# Patient Record
Sex: MALE | Race: WHITE
[De-identification: names, ages, dates, MRNs, and addresses within clinical notes are randomized per-mention and may not be internally consistent; named-entity substitution may affect disease eponyms.]

---

## 2018-12-17 ENCOUNTER — HOSPITAL ENCOUNTER (EMERGENCY)
Dept: HOSPITAL 97 - ER | Age: 63
LOS: 1 days | Discharge: HOME | End: 2018-12-18
Payer: COMMERCIAL

## 2018-12-17 DIAGNOSIS — E78.5: ICD-10-CM

## 2018-12-17 DIAGNOSIS — F17.210: ICD-10-CM

## 2018-12-17 DIAGNOSIS — M13.861: Primary | ICD-10-CM

## 2018-12-17 DIAGNOSIS — M25.461: ICD-10-CM

## 2018-12-17 LAB
BUN BLD-MCNC: 25 MG/DL (ref 7–18)
CRP SERPL-MCNC: 24.4 MG/L (ref ?–3)
GLUCOSE SERPLBLD-MCNC: 136 MG/DL (ref 74–106)
HCT VFR BLD CALC: 39.1 % (ref 39.6–49)
LYMPHOCYTES # SPEC AUTO: 1.5 K/UL (ref 0.7–4.9)
PMV BLD: 8.8 FL (ref 7.6–11.3)
POTASSIUM SERPL-SCNC: 3.7 MMOL/L (ref 3.5–5.1)
RBC # BLD: 4.33 M/UL (ref 4.33–5.43)

## 2018-12-17 PROCEDURE — 99284 EMERGENCY DEPT VISIT MOD MDM: CPT

## 2018-12-17 PROCEDURE — 80048 BASIC METABOLIC PNL TOTAL CA: CPT

## 2018-12-17 PROCEDURE — 96375 TX/PRO/DX INJ NEW DRUG ADDON: CPT

## 2018-12-17 PROCEDURE — 96374 THER/PROPH/DIAG INJ IV PUSH: CPT

## 2018-12-17 PROCEDURE — 85025 COMPLETE CBC W/AUTO DIFF WBC: CPT

## 2018-12-17 PROCEDURE — 87040 BLOOD CULTURE FOR BACTERIA: CPT

## 2018-12-17 PROCEDURE — 83605 ASSAY OF LACTIC ACID: CPT

## 2018-12-17 PROCEDURE — 89050 BODY FLUID CELL COUNT: CPT

## 2018-12-17 PROCEDURE — 36415 COLL VENOUS BLD VENIPUNCTURE: CPT

## 2018-12-17 PROCEDURE — 85652 RBC SED RATE AUTOMATED: CPT

## 2018-12-17 PROCEDURE — 86140 C-REACTIVE PROTEIN: CPT

## 2018-12-17 PROCEDURE — 87070 CULTURE OTHR SPECIMN AEROBIC: CPT

## 2018-12-17 PROCEDURE — 89060 EXAM SYNOVIAL FLUID CRYSTALS: CPT

## 2018-12-17 NOTE — XMS REPORT
Mode Robledo MD

 Created on:2018



Patient:MARINO ALMODOVAR

Sex:Male

:1955

External Reference #:[id number]





Demographics







 Address  [address] [place] [id number]

 

 Phone  [phone]

 

 Preferred Language  Unknown

 

 Marital Status  Unknown

 

 Jewish Affiliation  Unknown

 

 Race  Unknown

 

 Ethnic Group  Unknown









Author







 Organization  [employer]









Care Team Providers







 Name  Role  Phone

 

 ChinChristine  Provider Role  Unavailable









Allergies, Adverse Reactions, Alerts







 Substance  Reaction  Event Type

 

 Lyrica  Info Not Available  Drug Allergy

 

 Orencia  Info Not Available  Non Drug Allergy

 

 Humira  Info Not Available  Non Drug Allergy

 

 Enbrel  Info Not Available  Non Drug Allergy







Problems







 Problem Type  Condition  Code  Onset Dates  Condition Status

 

 Assessment  Rheumatoid arthritis of multiple  M06.09    Active



   sites without rheumatoid factor      

 

 Assessment  Other long term (current) drug  Z79.899    Active



   therapy      

 

 Problem  Osteopenia  M85.80    Active

 

 Problem  Pain of right hand  M79.641    Active

 

 Problem  Other chronic pain  G89.29    Active

 

 Problem  Cigarette nicotine dependence,  F17.210    Active



   uncomplicated      

 

 Problem  Other long term (current) drug  Z79.899    Active



   therapy      

 

 Problem  Pain of left hand  M79.642    Active

 

 Problem  Rheumatoid arthritis of multiple  M06.09    Active



   sites without rheumatoid factor      







Medications







 Medication  Code  Code  Instructions  Start  End Date  Status  Dosage



   System      Date      

 

 Inderal LA  NDC  52755815827  160 MG Orally      Active  1 capsule



       Once a day        

 

 Propranolol HCl  NDC  35413586140  10 MG Orally      Active  1 tablet



       Once a day        

 

 Multivitamins  NDC  97560789753   Orally once a      Active  1 tablet



       day        

 

 Voltaren  NDC  81577615908  1 % Transdermal      Active  as



       PRN        directed

 

 Medrol Dose Norris  NDC  74974110891  4mg Orally once  Dec 07,    Active  as



       a day  2017      directed

 

 Xeljanz XR  NDC  01005047192  11 MG Orally    ,  Active  1 tablet



       Once a day    2018    

 

 Tricor  NDC  54136807208  145 MG Orally      Active  1 tablet



       Once a day        

 

 Trazodone HCl  NDC  06128606028  150 MG Orally      Active  1 tablet



       at night        

 

 Nuvigil  NDC  46342947959  250 MG Orally      Active  1 tablet



       Once a day        in the



               morning

 

 Calcium  NDC  96934681817  otc Orally once      Active  1 tablet



       a day        with meals

 

 Hydrocodone-Acet  ND  30298983354   MG    April  Active  1 tab



 aminophen      Orally BID to    2018    



       QID as needed        







Vital Signs







 Date/Time:  2018

 

 BMI  28.38 Index

 

 Weight  215.1 lbs

 

 Height  73 in

 

 Temperature  95.9 F

 

 Cardiac Monitoring Heart Rate  68 /min

 

 Blood Pressure Diastolic  98 mm Hg

 

 Blood Pressure Systolic  140 mm Hg







Results

No Known Results



Summary Purpose

[employer]Clinical[employer] Submission

## 2018-12-17 NOTE — XMS REPORT
Mode Robledo MD

 Created on:2015



Patient:MARINO ALMODOVAR

Sex:Male

:1955

External Reference #:[id number]





Demographics







 Address  [address] [place] [id number]

 

 Phone  [phone]

 

 Preferred Language  Unknown

 

 Marital Status  Unknown

 

 Adventist Affiliation  Unknown

 

 Race  Unknown

 

 Ethnic Group  Unknown









Author







 Organization  [employer]









Care Team Providers







 Name  Role  Phone

 

 Mode Robledo  Provider Role  Unavailable









Encounters







 Encounter  Location  Date

 

 lab order  Mode Robledo MD  2014

 

 Unknown  Mode Robledo MD  2014

 

 RX Request-- Sherine Robledo MD  2014

 

 Refill   hydrocodone  11/15  Mode Robledo MD  2014

 

 Unknown  Mode Robledo MD  May 06, 2014

 

 Refill- Sherine Robledo MD  May 29, 2014

 

 Refill-Sherine Robledo MD  2014

 

 MRI [facility] Hands  Mode Robledo MD  2015

 

 Refill  Mode Robledo MD  Dec 12, 2014

 

 F/U  Mode Robledo MD  Oct 15, 2014







Problems







 Problem Type  Condition  ICD-9 Code  Onset Dates  Condition Status

 

 Problem  Long-term (current) use of other  V58.69    Active



   medications - High Risk      

 

 Problem  Long-term (current) use of  V58.65    Active



   steroids      

 

 Problem  Rheumatoid arthritis  714.0    Active

 

 Problem  Hypertension  997.91    Active







Social History







 Social History Element  Qualifiers  Date Reported

 

 Illicit Drugs  .  none  2015

 

 Tobacco Use:  .   Are you a:: current smoker  2015

 

 Caffeine:  yes.  1-5  2015

 

 Exercise:  yes.  walk  2015

 

 Alcohol:  no.  2015







Summary Purpose

[employer]Clinical[employer] Submission

## 2018-12-17 NOTE — XMS REPORT
Mode Robledo MD

 Created on:August 3, 2015



Patient:MARINO ALMODOVAR

Sex:Male

:1955

External Reference #:[id number]





Demographics







 Address  [address] [place] [id number]

 

 Phone  [phone]

 

 Preferred Language  Unknown

 

 Marital Status  Unknown

 

 Church Affiliation  Unknown

 

 Race  Unknown

 

 Ethnic Group  Unknown









Author







 [facility]









Care Team Providers







 Name  Role  Phone

 

 Mode Robledo  Provider Role  Unavailable









Encounters







 Encounter  Location  Date

 

 [facility]  Mode Robledo MD  2015

 

 rx refill  Mode Robledo MD  2015

 

 [facility]  Mode Robledo MD  2015

 

 Refill- Sherine Robledo MD  2015

 

 1 mo f/u  Mode Robledo MD  2015

 

 3m f/u  Mode Robledo MD  2015

 

 3 mo f/u  Mode Robledo MD  2015

 

 Orencjanine Robledo MD  2015

 

 Refill- Sherine Robledo MD  2015

 

 RX Request-- Sherine Robledo MD  2015

 

 lab order  Mode Robledo MD  2014

 

 Unknown  Mode Robledo MD  2014

 

 RX Request-- Sherine Robledo MD  2014

 

 Refill   hydrocodone  11/15  Mode Robledo MD  2014

 

 Unknown  Mode Robledo MD  May 06, 2014

 

 Refill- Sherine Robledo MD  May 29, 2014

 

 NellieillMiriam Robledo MD  2014

 

 1 mo f/u  Mode Robledo MD  May 12, 2015

 

 RX Request-- Sherine Robledo MD  May 06, 2015

 

 Refill  Mode Robledo MD  Dec 12, 2014

 

 F/U  Mode Robledo MD  Oct 15, 2014

 

 MRI  Mode Robledo MD  May 14, 2015

 

 RefillMarbella Robledo MD  2015

 

 refill  Mode Robledo MD  Aug 03, 2015

 

 Refill  Mode Robledo MD  Aug 03, 2015







Problems







 Problem Type  Condition  ICD-9 Code  Onset Dates  Condition Status

 

 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

 

 Problem  Rheumatoid arthritis  714.0    Active

 

 Problem  Lumbago  724.2    Active

 

 Problem  Hypertension  997.91    Active

 

 Problem  Long-term (current) use of other  V58.69    Active



   medications - High Risk      

 

 Problem  Long-term (current) use of  V58.65    Active



   steroids      







Medications







 Medication  Code System  Code  Instructions  Start Date  End Date  Status  
Dosage

 

 ORENCIA SQ  Unknown  0  125MG SUBCUTANEOUSLY    Aug 04,  Active  125mg



       ONCE A WEEK        







Social History







 Social History Element  Qualifiers  Date Reported

 

 Illicit Drugs  .  none  2015

 

 Alcohol Screening:  .   Points: 0  2015

 

 Tobacco Use:  .   Are you a:: current smoker , How often do  2015



   you smoke cigarettes?: every day, How many  



   cigarettes a day do you smoke?: 11-20, How soon  



   after you wake up do you smoke your first  



   cigarette?: 6-30 min, Are you interested in  



   quitting?: Not ready to quit  

 

 Caffeine:  yes.  1-5  2015

 

 Exercise:  yes.  walk  2015

 

 Alcohol:  no.  2015







Summary Purpose

[employer]ClinicalWorks Submission

## 2018-12-17 NOTE — XMS REPORT
Mode Robledo MD

 Created on:2015



Patient:MARINO ALMODOVAR

Sex:Male

:1955

External Reference #:[id number]





Demographics







 Address  [address] [place] [id number]

 

 Phone  [phone]

 

 Preferred Language  Unknown

 

 Marital Status  Unknown

 

 Taoism Affiliation  Unknown

 

 Race  Unknown

 

 Ethnic Group  Unknown









Author







 Organization  [facility]









Care Team Providers







 Name  Role  Phone

 

 Mode Robledo  Provider Role  Unavailable









Encounters







 Encounter  Location  Date

 

 lab order  Mode Robledo MD  2014

 

 Unknown  Mode Robledo MD  2014

 

 RX Request-- Sherine Robledo MD  2014

 

 Refill   hydrocodone  11/15  Mode Robledo MD  2014

 

 Unknown  Mode Robledo MD  May 06, 2014

 

 Refill- Sherine Robledo MD  May 29, 2014

 

 Refill-Sherine Robledo MD  2014

 

 MRI [facility] Hands  Mode Robledo MD  2015

 

 1 mo f/u  Mode Robledo MD  May 12, 2015

 

 RX Request-- Sherine Robledo MD  May 06, 2015

 

 Refill  Mode Robledo MD  Dec 12, 2014

 

 F/U  Mode Robledo MD  Oct 15, 2014

 

 rx refill  Mode Robledo MD  2015

 

 MRI [facility] Hands  Mode Robledo MD  2015

 

 Refnick- Sherine Robledo MD  2015

 

 1 mo f/u  Mode Robledo MD  2015

 

 MRI  Mode Robledo MD  May 14, 2015

 

 3m f/u  Mode Robledo MD  2015

 

 Refill- Sherine Robledo MD  2015

 

 3 mo f/u  Mode Robledo MD  2015

 

 Sherine Robledo MD  2015

 

 Refill- Sherine Robledo MD  2015

 

 RX Request-- Sherine Robledo MD  2015







Problems







 Problem Type  Condition  ICD-9 Code  Onset Dates  Condition Status

 

 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

 

 Problem  Rheumatoid arthritis  714.0    Active

 

 Problem  Lumbago  724.2    Active

 

 Problem  Hypertension  997.91    Active

 

 Problem  Long-term (current) use of other  V58.69    Active



   medications - High Risk      

 

 Problem  Long-term (current) use of  V58.65    Active



   steroids      







Medications







 Medication  Code System  Code  Instructions  Start Date  End Date  Status  
Dosage

 

 ORENCIA SQ  Unknown  0  125MG SUBCUTANEOUSLY    Aug 04,  Active  125mg



       ONCE A WEEK        







Social History







 Social History Element  Qualifiers  Date Reported

 

 Illicit Drugs  .  none  2015

 

 Alcohol Screening:  .   Points: 0  2015

 

 Tobacco Use:  .   Are you a:: current smoker , How often do  2015



   you smoke cigarettes?: every day, How many  



   cigarettes a day do you smoke?: 11-20, How soon  



   after you wake up do you smoke your first  



   cigarette?: 6-30 min, Are you interested in  



   quitting?: Not ready to quit  

 

 Caffeine:  yes.  1-5  2015

 

 Exercise:  yes.  walk  2015

 

 Alcohol:  no.  2015







Summary Purpose

[employer]ClinicalWorks Submission

## 2018-12-17 NOTE — XMS REPORT
Mode Robledo MD

 Created on:May 29, 2014



Patient:MARINO ALMODOVAR

Sex:Male

:1955

External Reference #:[id number]





Demographics







 Address  [address] [place] [address]

 

 Phone  [phone]

 

 Preferred Language  Unknown

 

 Marital Status  Unknown

 

 Jewish Affiliation  Unknown

 

 Race  Unknown

 

 Ethnic Group  Unknown









Author







 Organization  [employer]









Care Team Providers







 Name  Role  Phone

 

 Alina Chinnna  Provider Role  Unavailable









Allergies, Adverse Reactions, Alerts







 Substance  Reaction  Event Type

 

 Lyrica  Info Not Available  Drug Allergy

 

 Humira  Info Not Available  Non Drug Allergy

 

 Enbrel  Info Not Available  Non Drug Allergy







Encounters







 Encounter  Location  Date

 

 Unknown  Mode Robledo MD  May 06, 2014

 

 Refill- Orencia  Mode Robledo MD  May 29, 2014







Problems







 Problem Type  Condition  ICD-9 Code  Onset Dates  Condition Status

 

 Problem  Long-term (current) use of other  V58.69    Active



   medications - High Risk      

 

 Problem  Long-term (current) use of  V58.65    Active



   steroids      

 

 Problem  Rheumatoid arthritis  714.0    Active

 

 Assessment  Long-term (current) use of other  V58.69    Active



   medications - High Risk      

 

 Assessment  Long-term (current) use of  V58.65    Active



   steroids      

 

 Problem  Hypertension  997.91    Active

 

 Assessment  Rheumatoid arthritis  714.0    Active







Medications







 Medication  Code System  Code  Instructions  Start  End  Status  Dosage



         Date  Date    

 

 Methotrexate  MEDISPAN  46857-9  25 MG/ML SQ      Active  1 ml



 Sodium    173-00  injection Once a        



       week        

 

 Multivitamins  MEDISPAN  12153-1   Orally once a      Active  1 tablet



     046-10  day        

 

 Hydrocodone-Aceta  MEDISPAN  95568-6   MG Orally  March  Aug 04,  Active
  1 tablet as



 minophen    367-01  every 6 hrs  2014    needed

 

 Folic Acid  MEDISPAN  07168-9  1 MG Orally Once    Aug 04,  Active  1 tablet



     507-19  a day        

 

 Tricor  MEDISPAN  84210-9  145 MG Orally      Active  1 tablet



     123-90  Once a day        

 

 Trazodone HCl  MEDISPAN  00378-3  150 MG Orally at      Active  1 tablet



     473-01  night        

 

 Calcium  MEDISPAN  72621-8  otc Orally once a      Active  1 tablet



     5034  day        with meals

 

 PredniSONE  MEDISPAN  11577-7  5 MG Orally Once  March  Aug 04,  Active  1 
tablet



     390-21  a day  2014    

 

 Voltaren  MEDISPAN  19416-1  1 % Transdermal    Aug 04,  Active  as directed



     215-05  Three times a day        

 

 ORENCIA SQ  Unknown  0  125MG    Aug 04,  Active  125MG



       SUBCUTANEOUSLY        



       ONCE A WEEK        

 

 Nexium  MEDISPAN  75600-0  40 MG Orally once      Active  1 capsule



     040-31  a day        

 

 Inderal LA  MEDISPAN  17793-0  160 MG Orally      Active  1 capsule



     659-81  Once a day        







Social History







 Social History Element  Qualifiers  Date Reported

 

 Illicit Drugs  .  none  May 06, 2014

 

 Tobacco Use:  .   Are you a:: current smoker  May 06, 2014

 

 Caffeine:  yes.  1-5  May 06, 2014

 

 Exercise:  yes.  walk  May 06, 2014

 

 Alcohol:  no.  May 06, 2014







Vital Signs







 Date/Time:  May 06, 2014

 

 Weight  210 lbs

 

 Height  72 in

 

 Temperature  96.9 F

 

 Cardiac Monitoring Heart Rate  80 /min

 

 Blood Pressure Diastolic  80 mm Hg

 

 Blood Pressure Systolic  132 mm Hg







Immunizations







 Vaccine  Administration Date

 

 Depomedrol  May 06, 2014

 

 Toradol  May 06, 2014







Summary Purpose

[employer]ClinicalWorks Submission

## 2018-12-17 NOTE — XMS REPORT
Mode Robledo MD

 Created on:2015



Patient:MARINO ALMODOVAR

Sex:Male

:1955

External Reference #:[id number]





Demographics







 Address  [address] [place] [id number]

 

 Phone  [phone]

 

 Preferred Language  Unknown

 

 Marital Status  Unknown

 

 Restorationist Affiliation  Unknown

 

 Race  Unknown

 

 Ethnic Group  Unknown









Author







 [facility]









Care Team Providers







 Name  Role  Phone

 

 Christine Chin  Provider Role  Unavailable









Allergies, Adverse Reactions, Alerts







 Substance  Reaction  Event Type

 

 Lyrica  Info Not Available  Drug Allergy

 

 Humira  Info Not Available  Non Drug Allergy

 

 Enbrel  Info Not Available  Non Drug Allergy







Encounters







 Encounter  Location  Date

 

 lab order  Mode Robledo MD  2014

 

 Unknown  Mode Robledo MD  2014

 

 RX Request-- Sherine Robledo MD  2014

 

 Refill   hydrocodone  11/15  Mode Robldeo MD  2014

 

 Unknown  Mode Robledo MD  May 06, 2014

 

 Refill- Sherine Robledo MD  May 29, 2014

 

 Refill-Sherine Robledo MD  2014

 

 MRI Bi Hands  Mode Robledo MD  2015

 

 Refill  Mode Robledo MD  Dec 12, 2014

 

 F/U  Mode Robledo MD  Oct 15, 2014

 

 3m f/u  Mode Robledo MD  2015

 

 3 mo f/u  Mode Robledo MD  2015

 

 Orencia Irvin Robledo MD  2015

 

 Refill- Sherine Robledo MD  2015







Problems







 Problem Type  Condition  ICD-9 Code  Onset Dates  Condition Status

 

 Assessment  Long-term (current) use of other  V58.69    Active



   medications - High Risk      

 

 Problem  Rheumatoid arthritis  714.0    Active

 

 Problem  Long-term (current) use of other  V58.69    Active



   medications - High Risk      

 

 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

 

 Assessment  Rheumatoid arthritis  714.0    Active

 

 Assessment  Osteoarthrosis, multiple sites  715.09    Active

 

 Problem  Long-term (current) use of  V58.65    Active



   steroids      

 

 Problem  Hypertension  997.91    Active







Medications







 Medication  Code System  Code  Instructions  Start  End  Status  Dosage



         Date  Date    

 

 Folic Acid  MEDISPAN  65483-0  1 MG Orally Once      Active  1 tablet



     507-19  a day        

 

 Trazodone HCl  MEDISPAN  00378-3  150 MG Orally at      Active  1 tablet



     473-01  night        

 

 Tricor  MEDISPAN  42400-9  145 MG Orally      Active  1 tablet



     123-90  Once a day        

 

 Multivitamins  MEDISPAN  85307-1   Orally once a      Active  1 tablet



     046-10  day        

 

 Hydrocodone-Aceta  MEDISPAN  47473-8   MG Orally      Active  1 tablet as



 minophen    116-30  every 6 hrs        needed

 

 Inderal LA  MEDISPAN  54738-8  160 MG Orally      Active  1 capsule



     479-81  Once a day        

 

 Calcium  MEDISPAN  50504-9  otc Orally once a      Active  1 tablet



     5034  day        with meals

 

 Nexium  MEDISPAN  27362-3  40 MG Orally once      Active  1 capsule



     040-31  a day        

 

 Methotrexate  MEDISPAN  39231-7  25 MG/ML SQ      Active  1 ml



 Sodium    173-00  injection Once a        



       week        

 

 PredniSONE  MEDISPAN  73534-8  5 MG Orally Once      Active  1 tablet



     390-21  a day        

 

 ORENCIA SQ  Unknown  0  125MG      Active  125mg



       SUBCUTANEOUSLY        



       ONCE A WEEK        

 

 Voltaren  MEDISPAN  46084-5  1 % Transdermal      Active  as directed



     215-05  PRN        







Social History







 Social History Element  Qualifiers  Date Reported

 

 Illicit Drugs  .  none  2015

 

 Alcohol Screening:  .   Points: 0  2015

 

 Tobacco Use:  .   Are you a:: current smoker , How often do  2015



   you smoke cigarettes?: every day, How many  



   cigarettes a day do you smoke?: 11-20, How soon  



   after you wake up do you smoke your first  



   cigarette?: 6-30 min, Are you interested in  



   quitting?: Not ready to quit  

 

 Caffeine:  yes.  1-5  2015

 

 Exercise:  yes.  walk  2015

 

 Alcohol:  no.  2015







Vital Signs







 Date/Time:  2015

 

 Weight  200 lbs

 

 Height  74 in

 

 Temperature  97.4 F

 

 Cardiac Monitoring Heart Rate  72 /min

 

 Blood Pressure Diastolic  74 mm Hg

 

 Blood Pressure Systolic  126 mm Hg







Summary Purpose

eClinicalWorks Submission

## 2018-12-17 NOTE — XMS REPORT
Mode Robledo MD

 Created on:2017



Patient:MARINO ALMODOVAR

Sex:Male

:1955

External Reference #:[id number]





Demographics







 Address  [address] [place] [id number]

 

 Phone  [phone]

 

 Preferred Language  Unknown

 

 Marital Status  Unknown

 

 Restoration Affiliation  Unknown

 

 Race  Unknown

 

 Ethnic Group  Unknown









Author







 Organization  [employer]









Care Team Providers







 Name  Role  Phone

 

 Mode Robledo  Provider Role  Unavailable









Allergies

No Known Allergies



Problems







 Problem Type  Condition  Code  Onset Dates  Condition Status

 

 Problem  Pain of right hand  M79.641    Active

 

 Problem  Pain of left hand  M79.642    Active

 

 Problem  Osteopenia  M85.80    Active

 

 Problem  Other long term (current) drug  Z79.899    Active



   therapy      

 

 Problem  Rheumatoid arthritis of multiple  M06.09    Active



   sites without rheumatoid factor      

 

 Problem  Cigarette nicotine dependence,  F17.210    Active



   uncomplicated      







Medications

No Known Medications



Results

No Known Results



Summary Purpose

eClinicalWorks Submission

## 2018-12-17 NOTE — XMS REPORT
Mode Robledo MD

 Created on:2017



Patient:MARINO ALMODOVAR

Sex:Male

:1955

External Reference #:[id number]





Demographics







 Address  [address] [place] [id number]

 

 Phone  [phone]

 

 Preferred Language  Unknown

 

 Marital Status  Unknown

 

 [employer] Affiliation  Unknown

 

 Race  Unknown

 

 Ethnic Group  Unknown









Author







 Organization  [employer]









Care Team Providers







 Name  Role  Phone

 

 Christine Chin  Provider Role  Unavailable









Allergies, Adverse Reactions, Alerts







 Substance  Reaction  Event Type

 

 Lyrica  Info Not Available  Drug Allergy

 

 Orencia  Info Not Available  Non Drug Allergy

 

 Humira  Info Not Available  Non Drug Allergy

 

 Enbrel  Info Not Available  Non Drug Allergy







Problems







 Problem Type  Condition  Code  Onset Dates  Condition Status

 

 Assessment  Other long term (current) drug  Z79.899    Active



   therapy      

 

 Assessment  Osteopenia  M85.80    Active

 

 Problem  Pain of right hand  M79.641    Active

 

 Problem  Pain of left hand  M79.642    Active

 

 Problem  Osteopenia  M85.80    Active

 

 Problem  Other long term (current) drug  Z79.899    Active



   therapy      

 

 Assessment  Rheumatoid arthritis of multiple  M06.09    Active



   sites without rheumatoid factor      

 

 Problem  Rheumatoid arthritis of multiple  M06.09    Active



   sites without rheumatoid factor      

 

 Problem  Cigarette nicotine dependence,  F17.210    Active



   uncomplicated      







Medications







 Medication  Code  Code  Instructions  Start  End  Status  Dosage



   System      Date  Date    

 

 Voltaren  NDC  95159-87  1 % Transdermal      Active  as directed



     -  PRN        

 

 Nuvigil  NDC  22389-20  250 MG Orally      Active  1 tablet in



     94-30  Once a day        the morning

 

 Inderal LA  NDC  16186-88  160 MG Orally      Active  1 capsule



     79-81  Once a day        

 

 Calcium  NDC  75988-37  otc Orally once      Active  1 tablet



     034  a day        with meals

 

 Folic Acid  NDC  46554-79  1 MG Orally Once      Active  1 tablet



     07-19  a day        

 

 Propranolol HCl  NDC  10579-12  10 MG Orally      Active  1 tablet



     82-01  Once a day        

 

 Multivitamins  NDC  15350-16   Orally once a      Active  1 tablet



     46-10  day        

 

 Tricor  ND  90563-97  145 MG Orally      Active  1 tablet



     23-90  Once a day        

 

 Trazodone HCl  NDC  09025-10  150 MG Orally at      Active  1 tablet



     73-01  night        

 

 Hydrocodone-Aceta  NDC  97790-79   MG Orally      Active  1 tablet as



 minophen    16-30  every 6 hrs        needed

 

 Xeljanz XR  NDC  11860-82  11 MG Orally      Active  1 tablet



     01-30  Once a day        







Vital Signs







 Date/Time:  2017

 

 BMI  27.24 Index

 

 Weight  209.3 lbs

 

 Height  73.5 in

 

 Temperature  97.5 F

 

 Cardiac Monitoring Heart Rate  72 /min

 

 Blood Pressure Diastolic  76 mm Hg

 

 Blood Pressure Systolic  142 mm Hg







Results

No Known Results



Summary Purpose

eClinicalWorks Submission

## 2018-12-17 NOTE — XMS REPORT
Mode Robledo MD

 Created on:2014



Patient:MARINO ALMODOVAR

Sex:Male

:1955

External Reference #:[id number]





Demographics







 Address  [address] [place] [id number]

 

 Phone  [phone]

 

 Preferred Language  Unknown

 

 Marital Status  Unknown

 

 [employer] Affiliation  Unknown

 

 Race  Unknown

 

 Ethnic Group  Unknown









Author







 Organization  [employer]









Care Team Providers







 Name  Role  Phone

 

 Mode Robledo  Provider Role  Unavailable









Encounters







 Encounter  Location  Date

 

 lab order  Mode Robledo MD  2014

 

 Unknown  Mode Robledo MD  2014

 

 RX Request-- Sherine Robledo MD  2014

 

 Refill   hydrocodone  11/15  Mode Robledo MD  2014

 

 Unknown  Mode Robledo MD  May 06, 2014

 

 Refill- Sherine Robledo MD  May 29, 2014

 

 Refill-Sherine Robledo MD  2014

 

 Refill  Mode Robledo MD  Dec 12, 2014







Problems







 Problem Type  Condition  ICD-9 Code  Onset Dates  Condition Status

 

 Problem  Long-term (current) use of other  V58.69    Active



   medications - High Risk      

 

 Problem  Long-term (current) use of  V58.65    Active



   steroids      

 

 Problem  Rheumatoid arthritis  714.0    Active

 

 Problem  Hypertension  997.91    Active

 

 Assessment  Rheumatoid arthritis  714.0    Active







Medications







 Medication  Code System  Code  Instructions  Start  End Date  Status  Dosage



         Date      

 

 Hydrocodone-Ace  MEDISPAN  32277-64   MG Orally    ,  Active  1 
tablet



 taminophen    67-01  every 6 hrs        as needed







Social History







 Social History Element  Qualifiers  Date Reported

 

 Illicit Drugs  .  none  Oct 15, 2014

 

 Tobacco Use:  .   Are you a:: current smoker  Oct 15, 2014

 

 Caffeine:  yes.  1-5  Oct 15, 2014

 

 Exercise:  yes.  walk  Oct 15, 2014

 

 Alcohol:  no.  Oct 15, 2014







Summary Purpose

[facility]

## 2018-12-17 NOTE — XMS REPORT
Mode Robledo MD

 Created on:May 3, 2017



Patient:MARINO ALMODOVAR

Sex:Male

:1955

External Reference #:[id number]





Demographics







 Address  [address] [place] [id number]

 

 Phone  [phone]

 

 Preferred Language  Unknown

 

 Marital Status  Unknown

 

 [employer] Affiliation  Unknown

 

 Race  Unknown

 

 Ethnic Group  Unknown









Author







 Organization  [employer]









Care Team Providers







 Name  Role  Phone

 

 Mode Robledo  Provider Role  Unavailable









Allergies

No Known Allergies



Problems







 Problem Type  Condition  Code  Onset Dates  Condition Status

 

 Problem  Pain of right hand  M79.641    Active

 

 Problem  Pain of left hand  M79.642    Active

 

 Problem  Osteopenia  M85.80    Active

 

 Problem  Other long term (current) drug  Z79.899    Active



   therapy      

 

 Problem  Rheumatoid arthritis of multiple  M06.09    Active



   sites without rheumatoid factor      

 

 Problem  Cigarette nicotine dependence,  F17.210    Active



   uncomplicated      







Medications

No Known Medications



Results

No Known Results



Summary Purpose

[employer] Submission

## 2018-12-17 NOTE — XMS REPORT
oMde Robledo MD

 Created on:2017



Patient:MARINO ALMODOVAR

Sex:Male

:1955

External Reference #:[id number]





Demographics







 Address  [address] [place] [id number]

 

 Phone  [phone]

 

 Preferred Language  Unknown

 

 Marital Status  Unknown

 

 [employer] Affiliation  Unknown

 

 Race  Unknown

 

 Ethnic Group  Unknown









Author







 Organization  [employer]









Care Team Providers







 Name  Role  Phone

 

 Christine Chin  Provider Role  Unavailable









Allergies, Adverse Reactions, Alerts







 Substance  Reaction  Event Type

 

 Lyrica  Info Not Available  Drug Allergy

 

 Orencia  Info Not Available  Non Drug Allergy

 

 Humira  Info Not Available  Non Drug Allergy

 

 Enbrel  Info Not Available  Non Drug Allergy







Problems







 Problem Type  Condition  Code  Onset Dates  Condition Status

 

 Assessment  Cigarette nicotine dependence,  F17.210    Active



   uncomplicated      

 

 Assessment  Rheumatoid arthritis of multiple  M06.09    Active



   sites without rheumatoid factor      

 

 Assessment  Other long term (current) drug  Z79.899    Active



   therapy      

 

 Assessment  Other chronic pain  G89.29    Active

 

 Problem  Osteopenia  M85.80    Active

 

 Problem  Pain of right hand  M79.641    Active

 

 Problem  Other chronic pain  G89.29    Active

 

 Problem  Cigarette nicotine dependence,  F17.210    Active



   uncomplicated      

 

 Problem  Other long term (current) drug  Z79.899    Active



   therapy      

 

 Problem  Pain of left hand  M79.642    Active

 

 Problem  Rheumatoid arthritis of multiple  M06.09    Active



   sites without rheumatoid factor      







Medications







 Medication  Code  Code  Instructions  Start  End  Status  Dosage



   System      Date  Date    

 

 Tricor  NDC  36219875035  145 MG Orally      Active  1 tablet



       Once a day        

 

 Nuvigil  NDC  55825624363  250 MG Orally      Active  1 tablet in



       Once a day        the morning

 

 Medrol Dose Norris  NDC  85562642148  4mg Orally once  Dec 07,    Active  as 
directed



       a day        

 

 Hydrocodone-Acet  NDC  32167425408   MG      Active  1 tab



 aminophen      Orally twice a        



       day as needed        

 

 Inderal LA  ND  52108482521  160 MG Orally      Active  1 capsule



       Once a day        

 

 Folic Acid  NDC  70760129385  1 MG      Active  TAKE 1 BY



               MOUTH DAILY

 

 Multivitamins  NDC  46056411934   Orally once a      Active  1 tablet



       day        

 

 Xeljanz XR  NDC  12462914453  11 MG Orally      Active  1 tablet



       Once a day        

 

 Voltaren  NDC  75310779118  1 % Transdermal      Active  as directed



       PRN        

 

 Propranolol HCl  NDC  39502006526  10 MG Orally      Active  1 tablet



       Once a day        

 

 Calcium  NDC  99022025488  otc Orally once      Active  1 tablet



       a day        with meals

 

 Trazodone HCl  NDC  51366478012  150 MG Orally      Active  1 tablet



       at night        







Vital Signs







 Date/Time:  Dec 07, 2017

 

 BMI  28.03 Index

 

 Weight  212.5 lbs

 

 Height  73 in

 

 Temperature  96.6 F

 

 Cardiac Monitoring Heart Rate  64 /min

 

 Blood Pressure Diastolic  88 mm Hg

 

 Blood Pressure Systolic  124 mm Hg







Results

No Known Results



Summary Purpose

[employer]Clinical[employer] Submission

## 2018-12-17 NOTE — XMS REPORT
Mode Robledo MD

 Created on:August 3, 2015



Patient:MARINO ALMODOVAR

Sex:Male

:1955

External Reference #:[id number]





Demographics







 Address  [address] [place] [id number]

 

 Phone  [phone]

 

 Preferred Language  Unknown

 

 Marital Status  Unknown

 

 Hindu Affiliation  Unknown

 

 Race  Unknown

 

 Ethnic Group  Unknown









Author







 [facility]









Care Team Providers







 Name  Role  Phone

 

 Mode Robledo  Provider Role  Unavailable









Encounters







 Encounter  Location  Date

 

 [facility]  Mode Robledo MD  2015

 

 rx refill  Mode Robledo MD  2015

 

 [facility]  Mode Robledo MD  2015

 

 Refill- Sherine Robledo MD  2015

 

 1 mo f/u  Mode Robledo MD  2015

 

 3m f/u  Mode Robledo MD  2015

 

 3 mo f/u  Mode Robledo MD  2015

 

 Orencjanine Robledo MD  2015

 

 Refill- Sherine Robledo MD  2015

 

 RX Request-- Sherine Robledo MD  2015

 

 lab order  Mode Robledo MD  2014

 

 Unknown  Mode Robledo MD  2014

 

 RX Request-- Sherine Robledo MD  2014

 

 Refill   hydrocodone  11/15  Mode Robledo MD  2014

 

 Unknown  Mode Robledo MD  May 06, 2014

 

 Refill- Sherine Robledo MD  May 29, 2014

 

 NellieillMiriam Robledo MD  2014

 

 1 mo f/u  Mode Robledo MD  May 12, 2015

 

 RX Request-- Sherine Robledo MD  May 06, 2015

 

 Refill  Mode Robledo MD  Dec 12, 2014

 

 F/U  Mode Robledo MD  Oct 15, 2014

 

 MRI  Mode Robledo MD  May 14, 2015

 

 RefillMarbella Robledo MD  2015

 

 refill  Mode Robledo MD  Aug 03, 2015

 

 Refill  Mode Robledo MD  Aug 03, 2015







Problems







 Problem Type  Condition  ICD-9 Code  Onset Dates  Condition Status

 

 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

 

 Problem  Rheumatoid arthritis  714.0    Active

 

 Problem  Lumbago  724.2    Active

 

 Problem  Hypertension  997.91    Active

 

 Problem  Long-term (current) use of other  V58.69    Active



   medications - High Risk      

 

 Problem  Long-term (current) use of  V58.65    Active



   steroids      







Social History







 Social History Element  Qualifiers  Date Reported

 

 Illicit Drugs  .  none  2015

 

 Alcohol Screening:  .   Points: 0  2015

 

 Tobacco Use:  .   Are you a:: current smoker , How often do  2015



   you smoke cigarettes?: every day, How many  



   cigarettes a day do you smoke?: 11-20, How soon  



   after you wake up do you smoke your first  



   cigarette?: 6-30 min, Are you interested in  



   quitting?: Not ready to quit  

 

 Caffeine:  yes.  1-5  2015

 

 Exercise:  yes.  walk  2015

 

 Alcohol:  no.  2015







Summary Purpose

[employer]Clinical[employer] Submission

## 2018-12-17 NOTE — XMS REPORT
Mode Robledo MD

 Created on:2015



Patient:MARINO ALMODOVAR

Sex:Male

:1955

External Reference #:[id number]





Demographics







 Address  [address] [place] [id number]

 

 Phone  [phone]

 

 Preferred Language  Unknown

 

 Marital Status  Unknown

 

 Church Affiliation  Unknown

 

 Race  Unknown

 

 Ethnic Group  Unknown









Author







 Organization  [employer]









Care Team Providers







 Name  Role  Phone

 

 Mode Robledo  Provider Role  Unavailable









Encounters







 Encounter  Location  Date

 

 lab order  Mode Robledo MD  2014

 

 Unknown  Mode Robledo MD  2014

 

 RX Request-- Sherine oRbledo MD  2014

 

 Refill   hydrocodone  11/15  Mode Robledo MD  2014

 

 Unknown  Mode Robledo MD  May 06, 2014

 

 Orencia Nellieill  Mode Robledo MD  2015

 

 Refill- Sherine Robledo MD  May 29, 2014

 

 Refill- Sherine Robledo MD  2015

 

 Refill-Sherine Robledo MD  2014

 

 [facility]  Mode Robledo MD  2015

 

 Refill  Mode Robledo MD  Dec 12, 2014

 

 F/U  Mode Robledo MD  Oct 15, 2014







Problems







 Problem Type  Condition  ICD-9 Code  Onset Dates  Condition Status

 

 Problem  Long-term (current) use of other  V58.69    Active



   medications - High Risk      

 

 Problem  Long-term (current) use of  V58.65    Active



   steroids      

 

 Problem  Rheumatoid arthritis  714.0    Active

 

 Problem  Hypertension  997.91    Active

 

 Assessment  Rheumatoid arthritis  714.0    Active







Medications







 Medication  Code System  Code  Instructions  Start Date  End Date  Status  
Dosage

 

 ORENCIA SQ  Unknown  0  125MG SUBCUTANEOUSLY      Active  125mg



       ONCE A WEEK        







Social History







 Social History Element  Qualifiers  Date Reported

 

 Illicit Drugs  .  none  2015

 

 Tobacco Use:  .   Are you a:: current smoker  2015

 

 Caffeine:  yes.  1-5  2015

 

 Exercise:  yes.  walk  2015

 

 Alcohol:  no.  2015







Summary Purpose

[employer]Clinical[employer] Submission

## 2018-12-17 NOTE — XMS REPORT
Mode Robledo MD

 Created on:May 29, 2014



Patient:MARINO ALMODOVAR

Sex:Male

:1955

External Reference #:[id number]





Demographics







 Address  [address] [place] [id number]

 

 Phone  [phone]

 

 Preferred Language  Unknown

 

 Marital Status  Unknown

 

 [employer] Affiliation  Unknown

 

 Race  Unknown

 

 Ethnic Group  Unknown









Author







 Organization  [employer]









Care Team Providers







 Name  Role  Phone

 

 ChinAlina grandenna  Provider Role  Unavailable









Encounters







 Encounter  Location  Date

 

 Unknown  Mode Robledo MD  May 06, 2014

 

 Refill- Anthonyncia  Mode Robledo MD  May 29, 2014







Problems







 Problem Type  Condition  ICD-9 Code  Onset Dates  Condition Status

 

 Problem  Long-term (current) use of other  V58.69    Active



   medications - High Risk      

 

 Problem  Long-term (current) use of  V58.65    Active



   steroids      

 

 Problem  Rheumatoid arthritis  714.0    Active

 

 Problem  Hypertension  997.91    Active

 

 Assessment  Rheumatoid arthritis  714.0    Active







Medications







 Medication  Code System  Code  Instructions  Start Date  End Date  Status  
Dosage

 

 ORENCIA SQ  Unknown  0  125MG SUBCUTANEOUSLY      Active  125MG



       ONCE A WEEK        







Social History







 Social History Element  Qualifiers  Date Reported

 

 Illicit Drugs  .  none  May 06, 2014

 

 Tobacco Use:  .   Are you a:: current smoker  May 06, 2014

 

 Caffeine:  yes.  1-5  May 06, 2014

 

 Exercise:  yes.  walk  May 06, 2014

 

 Alcohol:  no.  May 06, 2014







Summary Purpose

[employer]ClinicalWorks Submission

## 2018-12-17 NOTE — XMS REPORT
Mode Robledo MD

 Created on:2016



Patient:MARINO ALMODOVAR

Sex:Male

:1955

External Reference #:[id number]





Demographics







 Address  [address] [place] [id number]

 

 Phone  [phone]

 

 Preferred Language  Unknown

 

 Marital Status  Unknown

 

 [employer] Affiliation  Unknown

 

 Race  Unknown

 

 Ethnic Group  Unknown









Author







 [facility]  [facility]









Care Team Providers







 Name  Role  Phone

 

 Christine Chin  Provider Role  Unavailable









Allergies, Adverse Reactions, Alerts







 Substance  Reaction  Event Type

 

 Lyrica  Info Not Available  Drug Allergy

 

 Humira  Info Not Available  Non Drug Allergy

 

 Enbrel  Info Not Available  Non Drug Allergy







Encounters







 Encounter  Location  Date

 

 [facility]  Mode Robledo MD  2015

 

 3 MTH FU  Mode Robledo MD  2016

 

 actemra  Mode Robledo MD  2016

 

 deanne Robledo MD  2016

 

 rx refill  Mode Robledo MD  2015

 

 [facility]  Mode Robledo MD  2015

 

 MRI/DEXA  Mode Robledo MD  2016

 

 Refill- Deanne Robledo MD  2015

 

 3 MTH FU  Mode Robledo MD  2016

 

 1 mo f/u  Mode Robledo MD  2015

 

 3m f/u  Mode Robledo MD  2015

 

 3 mo f/u  Mode Robledo MD  2015

 

 Deanne Robledo MD  2015

 

 Refill- Deanne Robledo MD  2015

 

 RX Request-- Deanne Robledo MD  2015

 

 lab order  Mode Robledo MD  2014

 

 Unknown  Mode Robledo MD  2014

 

 RX Request-- Deanne Robledo MD  2014

 

 Refill   hydrocodone  11/15  Mode Robledo MD  2014

 

 Unknown  Mode Robledo MD  May 06, 2014

 

 Refill- Deanne Robledo MD  May 29, 2014

 

 Refill-Deanne Robledo MD  2014

 

 1 mo f/u  Mode Robledo MD  May 12, 2015

 

 RX Request-- Deanne Robledo MD  May 06, 2015

 

 Refill  Mode Robledo MD  Dec 12, 2014

 

 F/NEGAR Robledo MD  Oct 15, 2014

 

 Unknown  Mode Robledo MD  Aug 06, 2015

 

 1 mo f/u  Mode Robledo MD  2015

 

 1 mo f/u  Mode Robledo MD  2015

 

 Unknown  Mode Robledo MD  2015

 

 MRI  Mode Robledo MD  May 14, 2015

 

 Refill- Deanne Robledo MD  2015

 

 refill  Mode Robledo MD  Aug 03, 2015

 

 Refill  Mode Robledo MD  Aug 03, 2015







Problems







 Problem Type  Condition  ICD-9 Code  Onset Dates  Condition Status

 

 Assessment  Other long term (current) drug  Z79.899    Active



   therapy      

 

 Problem  Rheumatoid arthritis of multiple  714.0    Active



   sites without organ or system      



   involvement with positive      



   rheumatoid factor      

 

 Assessment  Rheumatoid arthritis of multiple  M06.09    Active



   sites without rheumatoid factor      

 

 Problem  Other long term (current) drug  Z79.899    Active



   therapy      

 

 Problem  Lumbago  724.2    Active

 

 Problem  Cigarette nicotine dependence,  F17.210    Active



   uncomplicated      

 

 Problem  Long-term (current) use of  V58.65    Active



   steroids      

 

 Problem  Hypertension  997.91    Active

 

 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

 

 Problem  Long-term (current) use of other  V58.69    Active



   medications - High Risk      







Medications







 Medication  Code System  Code  Instructions  Start  End  Status  Dosage



         Date  Date    

 

 Hydrocodone-Acet  MEDISP  [address]-43   MG    May  Active  1 tablet 
as



 aminophen      Orally every 6    27,    needed



       hrs        

 

 Methotrexate  [facility]  [address]-10  25 MG/ML SQ      Active  1 ml



 Sodium      injection Once        



       a week        

 

 Tricor  MED[facility]  [address]-09  145 MG Orally      Active  1 tablet



       Once a day        

 

 Nexium  MEDISPAN  [address]-05  40 MG Orally      Active  1 capsule



       prn        

 

 Inderal LA  [facility]  [address]-63  160 MG Orally      Active  1 capsule



       Once a day        

 

 Folic Acid  [facility]  [address]-82  1 MG Orally    Oct  Active  1 tablet



       Once a day    24,    once a day



               orally 90



               days

 

 Multivitamins  [facility]  [address]-65   Orally once a      Active  1 tablet



       day        

 

 Nuvigil  [facility]  [address]-89  250 MG Orally      Active  1 tablet in



       Once a day        the morning

 

 Voltaren  [facility]  [address]-48  1 %      Active  as directed



       Transdermal        



       PRN        

 

 Actemra SQ  [facility]  4031576244  162mg/0.9 mL  ,    Active  1 injection



       subcutaneous        



       every other        



       week        

 

 Calcium  [facility]  [address]  otc Orally      Active  1 tablet



       once a day        with meals

 

 Trazodone HCl  [facility]  [address]-34  150 MG Orally      Active  1 tablet



       at night        







Social History







 Social History Element  Qualifiers  Date Reported

 

 Illicit Drugs  .  none  2016

 

 Alcohol Screening:  .   Points: 0  2016

 

 Tobacco Use:  .   Are you a:: current smoker , How often do  2016



   you smoke cigarettes?: every day, How many  



   cigarettes a day do you smoke?: 11-20, How soon  



   after you wake up do you smoke your first  



   cigarette?: 6-30 min, Are you interested in  



   quitting?: Not ready to quit  

 

 Caffeine:  yes.  1-5  2016

 

 Exercise:  yes.  walk  2016

 

 Alcohol:  no.  2016







Vital Signs







 Date/Time:  2016

 

 Weight  206 lbs

 

 Height  73 in

 

 Temperature  97.0 F

 

 Cardiac Monitoring Heart Rate  70 /min

 

 Blood Pressure Diastolic  96 mm Hg

 

 Blood Pressure Systolic  150 mm Hg







Summary Purpose

eClinicalWorks Submission

## 2018-12-17 NOTE — XMS REPORT
Mode Robledo MD

 Created on:2017



Patient:MARINO ALMODOVAR

Sex:Male

:1955

External Reference #:[id number]





Demographics







 Address  [address] [place] [id number]

 

 Phone  [phone]

 

 Preferred Language  Unknown

 

 Marital Status  Unknown

 

 Jewish Affiliation  Unknown

 

 Race  Unknown

 

 Ethnic Group  Unknown









Author







 Organization  [employer]









Care Team Providers







 Name  Role  Phone

 

 Catherine Haley  Provider Role  Unavailable









Allergies, Adverse Reactions, Alerts







 Substance  Reaction  Event Type

 

 Lyrica  Info Not Available  Drug Allergy

 

 Orencia  Info Not Available  Non Drug Allergy

 

 Humira  Info Not Available  Non Drug Allergy

 

 Enbrel  Info Not Available  Non Drug Allergy







Problems







 Problem Type  Condition  Code  Onset Dates  Condition Status

 

 Assessment  Cigarette nicotine dependence,  F17.210    Active



   uncomplicated      

 

 Assessment  Rheumatoid arthritis of multiple  M06.09    Active



   sites without rheumatoid factor      

 

 Assessment  Other long term (current) drug  Z79.899    Active



   therapy      

 

 Assessment  Other chronic pain  G89.29    Active

 

 Problem  Osteopenia  M85.80    Active

 

 Problem  Pain of right hand  M79.641    Active

 

 Problem  Other chronic pain  G89.29    Active

 

 Problem  Cigarette nicotine dependence,  F17.210    Active



   uncomplicated      

 

 Problem  Other long term (current) drug  Z79.899    Active



   therapy      

 

 Problem  Pain of left hand  M79.642    Active

 

 Problem  Rheumatoid arthritis of multiple  M06.09    Active



   sites without rheumatoid factor      







Medications







 Medication  Code  Code  Instructions  Start  End  Status  Dosage



   System      Date  Date    

 

 Tricor  NDC  69685378143  145 MG Orally      Active  1 tablet



       Once a day        

 

 Xeljanz XR  NDC  24584465940  11 MG Orally      Active  1 tablet



       Once a day        

 

 Multivitamins  NDC  94004114818   Orally once a      Active  1 tablet



       day        

 

 Calcium  NDC  09902826785  otc Orally once      Active  1 tablet



       a day        with meals

 

 Hydrocodone-Acet  NDC  95494879303   MG    Dec 09,  Active  1 tab



 aminophen      Orally twice a        



       day as needed        

 

 Nuvigil  NDC  39102536757  250 MG Orally      Active  1 tablet in



       Once a day        the morning

 

 Inderal LA  NDC  58189607424  160 MG Orally      Active  1 capsule



       Once a day        

 

 Folic Acid  NDC  03352419445  1 MG      Active  TAKE 1 BY



               MOUTH DAILY

 

 Trazodone HCl  NDC  10170510160  150 MG Orally      Active  1 tablet



       at night        

 

 Propranolol HCl  NDC  35263515129  10 MG Orally      Active  1 tablet



       Once a day        

 

 Voltaren  NDC  70291043119  1 % Transdermal      Active  as directed



       PRN        







Vital Signs







 Date/Time:  2017

 

 BMI  28.21 Index

 

 Weight  208 lbs

 

 Height  72 in

 

 Temperature  96.3 F

 

 Cardiac Monitoring Heart Rate  68 /min

 

 Blood Pressure Diastolic  84 mm Hg

 

 Blood Pressure Systolic  142 mm Hg







Results







 Name  Result  Date  Reference Range  Unit  Abnormality Flag

 

 COMPREHENSIVE          



 METABOLIC PANEL          



 W/EGFR          

 

 ----CALCIUM  9.5  63020947  8.6-10.3  mg/dL  N

 

 ----CARBON DIOXIDE  26  02913731  20-31  mmol/L  N

 

 ----ALT  14  73764612  9-46  U/L  N

 

 ----CREATININE  1.00  11883074  0.70-1.25  mg/dL  N

 

 ----AST  23  06047194  10-35  U/L  N

 

 ----eGFR NON-AFR.  81  93540129  > OR=60  mL/min/1.  N



 AMERICAN        73m2  

 

 ----ALKALINE  43  08776736    U/L  N



 PHOSPHATASE          

 

 ----eGFR   94  13041211  > OR=60  mL/min/1.  N



 AMERICAN        73m2  

 

 ----BILIRUBIN, TOTAL  0.3  78952561  0.2-1.2  mg/dL  N

 

 ----BUN/CREATININE  NOT APPLICABLE  97655670  6-22  (calc)  



 RATIO          

 

 ----ALBUMIN/GLOBULIN  1.4  00114542  1.0-2.5  (calc)  N



 RATIO          

 

 ----SODIUM  141  94629673  135-146  mmol/L  N

 

 ----GLOBULIN  3.1  56494223  1.9-3.7  g/dL  N



         (calc)  

 

 ----POTASSIUM  4.8  50946838  3.5-5.3  mmol/L  N

 

 ----GLUCOSE  105  90325653  65-99  mg/dL  H

 

 ----CHLORIDE  106  75303628    mmol/L  N

 

 ----ALBUMIN  4.3  91412973  3.6-5.1  g/dL  N

 

 ----UREA NITROGEN  21  00240951  7-25  mg/dL  N



 (BUN)          

 

 ----PROTEIN, TOTAL  7.4  48603210  6.1-8.1  g/dL  N

 

 SED RATE BY MODIFIED          



 WESTERGREN          

 

 ----SED RATE BY  2  74774414  < OR=20  mm/h  N



 MODIFIED WESTERGREN          

 

 C-REACTIVE PROTEIN          

 

 ----C-REACTIVE  5.2  88060577  <8.0  mg/L  N



 PROTEIN          

 

 CBC (INCLUDES          



 DIFF/PLT)          

 

 ----MCHC  33.2  26062347  32.0-36.0  g/dL  N

 

 ----MCH  29.4  66448458  27.0-33.0  pg  N

 

 ----PLATELET COUNT  229  76416204  140-400  Thousand/  N



         uL  

 

 ----RDW  13.6  00682879  11.0-15.0  %  N

 

 ----BASOPHILS  0.5  89310675    %  N

 

 ----ABSOLUTE  4102  23318724  0999-0204  cells/uL  N



 NEUTROPHILS          

 

 ----ABSOLUTE  1554  08070849  850-3900  cells/uL  N



 LYMPHOCYTES          

 

 ----MPV  10.9  12954512  7.5-12.5  fL  N

 

 ----ABSOLUTE  33  70540395  0-200  cells/uL  N



 BASOPHILS          

 

 ----HEMATOCRIT  42.2  82773412  38.5-50.0  %  N

 

 ----NEUTROPHILS  63.1  91202911    %  N

 

 ----MCV  88.5  15210842  80.0-100.0  fL  N

 

 ----RED BLOOD CELL  4.77  27658844  4.20-5.80  Million/u  N



 COUNT        L  

 

 ----ABSOLUTE  689  55998150  200-950  cells/uL  N



 MONOCYTES          

 

 ----ABSOLUTE  124  73354032    cells/uL  N



 EOSINOPHILS          

 

 ----HEMOGLOBIN  14.0  71626813  13.2-17.1  g/dL  N

 

 ----EOSINOPHILS  1.9  40159975    %  N

 

 ----WHITE BLOOD CELL  6.5  90956074  3.8-10.8  Thousand/  N



 COUNT        uL  

 

 ----LYMPHOCYTES  23.9  97188002    %  N

 

 ----MONOCYTES  10.6  84680464    %  N

 

 PAIN MGMT, TRAMADOL,          



 QN,W/medMATCH,U          

 

 ----medMATCH  CONSISTENT  2017      



 Desmethyltram          

 

 ----Tramadol  NEGATIVE  10005436  <100  ng/mL  

 

 ----Prescribed Drug 1  Hydrocodone  2017      

 

 ----Desmethyltramadol  NEGATIVE  39546068  <100  ng/mL  

 

 ----medMATCH Tramadol  CONSISTENT  64414027      

 

 PAIN MGMT,TRICYCLIC          



 ANTI          



 DEPRESS,QN,W/medMATCH          



 ,U          

 

 ----medMATCH  CONSISTENT  2017      



 Nortriptyline          

 

 ----Nortriptyline  NEGATIVE  56593974  <100  ng/mL  

 

 ----medMATCH  CONSISTENT  2017      



 Amitriptyline          

 

 ----Amitriptyline  NEGATIVE  72901471  <100  ng/mL  

 

 ----Prescribed Drug 1  Hydrocodone  2017      

 

 PAIN MANAGEMENT          



 PROFILE 1 W/CONF,          



 W/DL, URINE          

 

 ----medMATCH Opiates  INCONSISTENT  2017      

 

 ----Oxycodone  NEGATIVE  99227612  <100  ng/mL  

 

 ----medMATCH Cocaine  CONSISTENT  07657602      



 Metab          

 

 ----Methadone  NEGATIVE  23216254  <100  ng/mL  



 Metabolite          

 

 ----medMATCH  CONSISTENT  2017      



 Methadone Metab          

 

 ----Opiates  NEGATIVE  75748905  <100  ng/mL  

 

 ----Prescribed Drug 1  Hydrocodone  2017      

 

 ----Creatinine  68.0  14571349  > or=20.0  mg/dL  

 

 ----pH  6.19  04079043  4.5 - 9.0    

 

 ----Oxidant  NEGATIVE  36238315  <200  mcg/mL  

 

 ----medMATCH  CONSISTENT  2017      



 Phencyclidine          

 

 ----Amphetamines  NEGATIVE  89695496  <500  ng/mL  

 

 ----Cocaine  NEGATIVE  55932510  <150  ng/mL  



 Metabolite          

 

 ----Phencyclidine  NEGATIVE  95345378  <25  ng/mL  

 

 ----medMATCH  CONSISTENT  2017      



 Amphetamines          

 

 ----medMATCH  CONSISTENT  2017      



 Marijuana Metab          

 

 ----Barbiturates  NEGATIVE  83515371  <300  ng/mL  

 

 ----Marijuana  NEGATIVE  35209136  <20  ng/mL  



 Metabolite          

 

 ----medMATCH  CONSISTENT  2017      



 Barbiturates          

 

 ----medMATCH  CONSISTENT  2017      



 Benzodiazepines          

 

 ----Benzodiazepines  NEGATIVE  52157519  <100  ng/mL  

 

 ----medMATCH  CONSISTENT  2017      



 Oxycodone          

 

 PAIN MGMT,          



 GABAPENTIN,          



 QN,W/medMATCH,U          

 

 ----Prescribed Drug 1  Hydrocodone  2017      

 

 ----Gabapentin  NEGATIVE  55327203  <1000  ng/mL  

 

 ----medMATCH  CONSISTENT  2017      



 Gabapentin          

 

 ZOLPIDEM,          



 QUANTITATIVE, URINE          

 

 ----ZOLPIDEM  NEGATIVE  99709545  <5  ng/mL  

 

 ----ZOLPIDEM  NEGATIVE  07442156  <5  ng/mL  



 METABOLITE          

 

 PAIN MGMT, FENTANYL,          



 QN,W/medMATCH,U          

 

 ----medMATCH  CONSISTENT  2017      



 Norfentanyl          

 

 ----Norfentanyl  NEGATIVE  38735912  <0.5  ng/mL  

 

 ----medMATCH Fentanyl  CONSISTENT  2017      

 

 ----Fentanyl  NEGATIVE  57000052  <0.5  ng/mL  

 

 ----Prescribed Drug 1  Hydrocodone  2017      

 

 PAIN MGMT,          



 PREGABALIN,          



 QN,W/medMATCH,U          

 

 ----medMATCH  CONSISTENT  2017      



 Pregabalin          

 

 ----Pregabalin  NEGATIVE  68528134  <1000  ng/mL  

 

 ----Prescribed Drug 1  Hydrocodone  2017      

 

 PAIN MGMT,          



 CARISOPRODOL          



 METAB,QN,W/medMATCH,U          

 

 ----medMATCH  CONSISTENT  2017      



 Meprobamate          

 

 ----Meprobamate  NEGATIVE  87962257  <1000  ng/mL  

 

 ----Prescribed Drug 1  Hydrocodone  2017      







Summary Purpose

[employer]ClinicalWorks Submission

## 2018-12-17 NOTE — XMS REPORT
Mode Robledo MD

 Created on:2016



Patient:MARINO ALMODOVAR

Sex:Male

:1955

External Reference #:[id number]





Demographics







 Address  [address] [place] [id number]

 

 Phone  [phone]

 

 Preferred Language  Unknown

 

 Marital Status  Unknown

 

 Episcopalian Affiliation  Unknown

 

 Race  Unknown

 

 Ethnic Group  Unknown









Author







 Organization  [facility]









Care Team Providers







 Name  Role  Phone

 

 Chin Christine  Provider Role  Unavailable









Encounters







 Encounter  Location  Date

 

 MRI Bi Hands  Mode Robledo MD  2015

 

 3 MTH FU  Mode Robledo MD  2016

 

 actemdarian Robledo MD  2016

 

 orencia live Robledo MD  2016

 

 Actemra rx  Mode Robledo MD  Aug 02, 2016

 

 rx refill  Mode Robledo MD  2015

 

 MRI Bi Hands  Mode Robledo MD  2015

 

 MRI/DEXA  Mode Robledo MD  2016

 

 Refill- Sherine Robledo MD  2015

 

 3 MTH CHRISTINA Robledo MD  2016

 

 1 mo f/u  Mode Robledo MD  2015

 

 3m f/u  Mode Robledo MD  2015

 

 3 mo f/u  Mode Robledo MD  2015

 

 Sherine Robledo MD  2015

 

 Refill- Sherine Robledo MD  2015

 

 RX Request-- Sherine Robledo MD  2015

 

 lab order  Mode Robledo MD  2014

 

 Unknown  Mode Robledo MD  2014

 

 RX Request-- Sherine Robledo MD  2014

 

 Refill   hydrocodone  11/15  Mode Robledo MD  2014

 

 Unknown  Mode Robledo MD  May 06, 2014

 

 Refill- Sherine Robledo MD  May 29, 2014

 

 Refill-Sherine Robledo MD  2014

 

 1 mo f/u  Mode Robledo MD  May 12, 2015

 

 RX Request-- Sherine Robledo MD  May 06, 2015

 

 Refill  Mode Robledo MD  Dec 12, 2014

 

 F/U  Mode Robledo MD  Oct 15, 2014

 

 Unknown  Mode Robledo MD  Aug 06, 2015

 

 1 mo f/u  Mode Robledo MD  2015

 

 1 mo f/u  Mode Robledo MD  2015

 

 Unknown  Mode Robledo MD  2015

 

 MRI  Mode Robledo MD  May 14, 2015

 

 Refill- Orebuster Robledo MD  2015

 

 refill  Mode Robledo MD  Aug 03, 2015

 

 Refill  Mode Robledo MD  Aug 03, 2015







Problems







 Problem Type  Condition  ICD-9 Code  Onset Dates  Condition Status

 

 Problem  Hypertension  997.91    Active

 

 Problem  Rheumatoid arthritis of multiple  714.0    Active



   sites without organ or system      



   involvement with positive      



   rheumatoid factor      

 

 Problem  Cigarette nicotine dependence,  F17.210    Active



   uncomplicated      

 

 Problem  Other long term (current) drug  Z79.899    Active



   therapy      

 

 Problem  Rheumatoid arthritis of multiple  M06.09    Active



   sites without rheumatoid factor      

 

 Problem  Long-term (current) use of other  V58.69    Active



   medications - High Risk      

 

 Problem  Long-term (current) use of  V58.65    Active



   steroids      

 

 Problem  Lumbago  724.2    Active

 

 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







Medications







 Medication  Code System  Code  Instructions  Start  End  Status  Dosage



         Date  Date    

 

 Actemra SQ  MEDISPAN  6071775251  162mg/0.9 mL  Aug 02,    Active  1 injection



       subcutaneous  2016      



       once a week        







Social History







 Social History Element  Qualifiers  Date Reported

 

 Illicit Drugs  .  none  2016

 

 Alcohol Screening:  .   Points: 0  2016

 

 Tobacco Use:  .   Are you a:: current smoker , How often do  2016



   you smoke cigarettes?: every day, How many  



   cigarettes a day do you smoke?: 11-20, How soon  



   after you wake up do you smoke your first  



   cigarette?: 6-30 min, Are you interested in  



   quitting?: Not ready to quit  

 

 Caffeine:  yes.  1-5  2016

 

 Exercise:  yes.  walk  2016

 

 Alcohol:  no.  2016







Summary Purpose

[employer]ClinicalWorks Submission

## 2018-12-17 NOTE — XMS REPORT
Mode Robledo MD

 Created on:2016



Patient:MARINO ALMODOVAR

Sex:Male

:1955

External Reference #:[id number]





Demographics







 Address  [address] [place] [id number]

 

 Phone  [phone]

 

 Preferred Language  Unknown

 

 Marital Status  Unknown

 

 [employer]ar[employer] Affiliation  Unknown

 

 Race  Unknown

 

 Ethnic Group  Unknown









Author







 [facility]









Care Team Providers







 Name  Role  Phone

 

 Christine Chin  Provider Role  Unavailable









Allergies, Adverse Reactions, Alerts







 Substance  Reaction  Event Type

 

 Lyrica  Info Not Available  Drug Allergy

 

 Orencia  Info Not Available  Non Drug Allergy

 

 Humira  Info Not Available  Non Drug Allergy

 

 Enbrel  Info Not Available  Non Drug Allergy







Encounters







 Encounter  Location  Date

 

 MRI Bi Hands  Mode Robledo MD  2015

 

 3 MTH CHRISTINA Robledo MD  2016

 

 actinga Robledo MD  2016

 

 deanne Robledo MD  2016

 

 Actemra rx  Mode Robledo MD  Aug 02, 2016

 

 rx refill  Mode Robledo MD  2015

 

 DEXA  Mode Robledo MD  Oct 03, 2016

 

 MRI Bi Hands  Mode Robledo MD  2015

 

 MRI/DEXA  Mode Robledo MD  2016

 

 Refill- Deanne Robledo MD  2015

 

 3 MTH CHRISTINA Robledo MD  2016

 

 1 mo f/u  Mode Robledo MD  2015

 

 3m f/u  Mode Robledo MD  2015

 

 3 mo f/u  Mode Robledo MD  2015

 

 3 MTH CHRISTINA Robledo MD  Oct 11, 2016

 

 Deanne Robledo MD  2015

 

 Refill- Deanne Robledo MD  2015

 

 RX Request-- Deanne Robledo MD  2015

 

 lab order  Mode Robledo MD  2014

 

 Unknown  Mode Robledo MD  2014

 

 RX Request-- Deanne Robledo MD  2014

 

 Refill   hydrocodone  11/15  Mode Robledo MD  2014

 

 Unknown  Mode Robledo MD  May 06, 2014

 

 Refill- Deanne Robledo MD  May 29, 2014

 

 Refill-Deanne Robledo MD  2014

 

 1 mo f/u  Mode Robledo MD  May 12, 2015

 

 RX Request-- Deanne Robledo MD  May 06, 2015

 

 Refill  Mode Robledo MD  Dec 12, 2014

 

 F/U  Mode Robledo MD  Oct 15, 2014

 

 Unknown  Mode Robledo MD  Aug 06, 2015

 

 1 mo f/u  Mode Robledo MD  2015

 

 1 mo f/u  Mode Robledo MD  2015

 

 Unknown  Mode Robledo MD  2015

 

 MRI  Mode Robledo MD  May 14, 2015

 

 Refill- Deanne Robledo MD  2015

 

 refill  Mode Robledo MD  Aug 03, 2015

 

 Refill  Mode Robledo MD  Aug 03, 2015







Problems







 Problem Type  Condition  ICD-9 Code  Onset Dates  Condition Status

 

 Problem  Pain of left hand  M79.642    Active

 

 Problem  Rheumatoid arthritis of multiple  M06.09    Active



   sites without rheumatoid factor      

 

 Problem  Pain of right hand  M79.641    Active

 

 Assessment  Rheumatoid arthritis of multiple  M06.09    Active



   sites without rheumatoid factor      

 

 Assessment  Other long term (current) drug  Z79.899    Active



   therapy      

 

 Problem  Cigarette nicotine dependence,  F17.210    Active



   uncomplicated      

 

 Problem  Other long term (current) drug  Z79.899    Active



   therapy      







Medications







 Medication  Code  Code  Instructions  Start  End  Status  Dosage



   System      Date  Date    

 

 Actemra SQ  [facility]  1474721899  162mg/0.9 mL  Aug 02,  Oct  Inactive  1 
injection



       subcutaneous  2016,    



       once a week        

 

 Nuvigil  [facility]  [address]-3  250 MG Orally      Active  1 tablet in



     0  Once a day        the morning

 

 Calcium  [facility]  [address]  otc Orally      Active  1 tablet



       once a day        with meals

 

 Voltaren  [facility]  [address]-6  1 %      Active  as directed



     1  Transdermal        



       PRN        

 

 Propranolol HCl  [facility]  [address]-4  10 MG Orally      Active  1 tablet



     1  Once a day        

 

 Nexium  [facility]  [address]-3  40 MG Orally      Active  1 capsule



     1  prn        

 

 Tricor  [facility]  [address]-8  145 MG Orally      Active  1 tablet



     0  Once a day        

 

 Xeljanz XR  [facility]  [address]-3  11 MG Orally  Oct 11,    Active  1 tablet



     0  Once a day        

 

 Methotrexate  [facility]  [address]-4  25 MG/ML      Active  1 ml



 Sodium    0  Injection Once        



       a week        

 

 Trazodone HCl  [facility]  [address]-5  150 MG Orally      Active  1 tablet



     1  at night        

 

 Multivitamins  [facility]  [address]-0   Orally once a      Active  1 tablet



     0  day        

 

 Folic Acid  [facility]  [address]-4  1 MG Orally      Active  1 tablet



     9  Once a day        once a day



               orally 90



               days

 

 Hydrocodone-Acet  [facility]  [address]-3   MG    Nov  Active  1 tablet as



 aminophen    0  Orally every 6    10,    needed



       hrs        

 

 Inderal LA  [facility]  [address]-8  160 MG Orally      Active  1 capsule



     1  Once a day        







Social History







 Social History Element  Qualifiers  Date Reported

 

 Illicit Drugs  .  none  Oct 11, 2016

 

 Alcohol Screening:  .   Points: 0  Oct 11, 2016

 

 Tobacco Use:  .   Are you a:: current smoker , How often do  Oct 11, 2016



   you smoke cigarettes?: every day, How many  



   cigarettes a day do you smoke?: 11-20, How soon  



   after you wake up do you smoke your first  



   cigarette?: 6-30 min, Are you interested in  



   quitting?: Not ready to quit  

 

 Caffeine:  yes.  1-5  Oct 11, 2016

 

 Exercise:  yes.  walk  Oct 11, 2016

 

 Alcohol:  no.  Oct 11, 2016







Vital Signs







 Date/Time:  Oct 11, 2016

 

 Weight  201 lbs

 

 Height  73 in

 

 Temperature  97.0 F

 

 Cardiac Monitoring Heart Rate  68 /min

 

 Blood Pressure Diastolic  80 mm Hg

 

 Blood Pressure Systolic  128 mm Hg







Results







 

 

 CBC (INCLUDES DIFF/PLT)









 ABSOLUTE BASOPHILS(-0-200 cells/uL)  35

 

 ABSOLUTE EOSINOPHILS(- cells/uL)  168

 

 LYMPHOCYTES(- %)  27.9

 

 NEUTROPHILS(- %)  63.6

 

 PLATELET COUNT(-140-400 Thousand/uL)  156

 

 EOSINOPHILS(- %)  2.9

 

 RDW(-11.0-15.0 %)  14.7

 

 MONOCYTES(- %)  5.0

 

 MCHC(-32.0-36.0 g/dL)  33.5

 

 MCH(-27.0-33.0 pg)  32.0

 

 MCV(-80.0-100.0 fL)  95.8

 

 ABSOLUTE NEUTROPHILS(-8336-4724 cells/uL)  3689

 

 MPV(-7.5-11.5 fL)  9.1

 

 ABSOLUTE MONOCYTES(-200-950 cells/uL)  290

 

 ABSOLUTE LYMPHOCYTES(-850-3900 cells/uL)  1618

 

 BASOPHILS(- %)  0.6

 

 WHITE BLOOD CELL COUNT(-3.8-10.8 Thousand/uL)  5.8

 

 RED BLOOD CELL COUNT(-4.20-5.80 Million/uL)  4.30

 

 HEMOGLOBIN(-13.2-17.1 g/dL)  13.8

 

 HEMATOCRIT(-38.5-50.0 %)  41.2







Summary Purpose

eClinicalWorks Submission

## 2018-12-17 NOTE — XMS REPORT
Mode Robledo MD

 Created on:2014



Patient:MARINO ALMODOVAR

Sex:Male

:1955

External Reference #:[id number]





Demographics







 Address  [address] [place] [address]

 

 Phone  [phone]

 

 Preferred Language  Unknown

 

 Marital Status  Unknown

 

 [employer] Affiliation  Unknown

 

 Race  Unknown

 

 Ethnic Group  Unknown









Author







 Organization  [employer]









Care Team Providers







 Name  Role  Phone

 

 Alina Chinnna  Provider Role  Unavailable









Allergies, Adverse Reactions, Alerts







 Substance  Reaction  Event Type

 

 Lyrica  Info Not Available  Drug Allergy

 

 Humira  Info Not Available  Non Drug Allergy

 

 Enbrel  Info Not Available  Non Drug Allergy







Encounters







 Encounter  Location  Date

 

 lab order  Mode Robledo MD  2014

 

 Unknown  Mode Robledo MD  2014

 

 Unknown  Mode Robledo MD  May 06, 2014

 

 Refill- Sherine Robledo MD  May 29, 2014

 

 Refill-Sherine Robledo MD  2014







Problems







 Problem Type  Condition  ICD-9 Code  Onset Dates  Condition Status

 

 Problem  Long-term (current) use of other  V58.69    Active



   medications - High Risk      

 

 Problem  Long-term (current) use of  V58.65    Active



   steroids      

 

 Problem  Rheumatoid arthritis  714.0    Active

 

 Assessment  Long-term (current) use of other  V58.69    Active



   medications - High Risk      

 

 Assessment  Long-term (current) use of  V58.65    Active



   steroids      

 

 Problem  Hypertension  997.91    Active

 

 Assessment  Rheumatoid arthritis  714.0    Active







Medications







 Medication  Code System  Code  Instructions  Start  End  Status  Dosage



         Date  Date    

 

 ORENCIA SQ  Unknown  0  125MG      Active  125MG



       SUBCUTANEOUSLY        



       ONCE A WEEK        

 

 Nexium  [facility]  84353-5  40 MG Orally once      Active  1 capsule



     040-31  a day        

 

 PredniSONE  [facility]  07135-3  5 MG Orally Once  March    Active  1 tablet



     390-21  a day  2014      

 

 Multivitamins  [facility]  60770-1   Orally once a      Active  1 tablet



     046-10  day        

 

 Voltaren  [facility]  50449-3  1 % Transdermal    Aug 04,  Active  as directed



     215-05  Three times a day        

 

 Calcium  [facility]  26930-5  otc Orally once a      Active  1 tablet



     5034  day        with meals

 

 Trazodone HCl  [facility]  00378-3  150 MG Orally at      Active  1 tablet



     473-01  night        

 

 Methotrexate  [facility]  26341-2  25 MG/ML SQ      Active  1 ml



 Sodium    173-00  injection Once a        



       week        

 

 Tricor  [facility]  22188-7  145 MG Orally      Active  1 tablet



     123-90  Once a day        

 

 Hydrocodone-Aceta  [facility]  33239-2   MG Orally  March    Active  1 
tablet as



 minophen    367-01  every 6 hrs  2014      needed

 

 Inderal LA  [facility]  02750-7  160 MG Orally      Active  1 capsule



     479-81  Once a day        

 

 Aricept  [facility]  55096-1  23 MG Orally Once      Active  1 tablet at



     247-30  a day        bedtime

 

 Folic Acid  [facility]  11331-9  1 MG Orally Once      Active  1 tablet



     507-19  a day        







Social History







 Social History Element  Qualifiers  Date Reported

 

 Illicit Drugs  .  none  2014

 

 Tobacco Use:  .   Are you a:: current smoker  2014

 

 Caffeine:  yes.  1-5  2014

 

 Exercise:  yes.  walk  2014

 

 Alcohol:  no.  2014







Vital Signs







 Date/Time:  2014

 

 Weight  204 lbs

 

 Height  72 in

 

 Temperature  97.0 F

 

 Cardiac Monitoring Heart Rate  80 /min

 

 Blood Pressure Diastolic  88 mm Hg

 

 Blood Pressure Systolic  140 mm Hg







Summary Purpose

eClinicalWorks Submission

## 2018-12-17 NOTE — XMS REPORT
Mode Robledo MD

 Created on:2015



Patient:MARINO ALMODOVAR

Sex:Male

:1955

External Reference #:[id number]





Demographics







 Address  [address] [place] [id number]

 

 Phone  [phone]

 

 Preferred Language  Unknown

 

 Marital Status  Unknown

 

 Islam Affiliation  Unknown

 

 Race  Unknown

 

 Ethnic Group  Unknown









Author







 Organization  [facility]









Care Team Providers







 Name  Role  Phone

 

 ChinAlina grandenna  Provider Role  Unavailable









Allergies, Adverse Reactions, Alerts







 Substance  Reaction  Event Type

 

 Lyrica  Info Not Available  Drug Allergy

 

 Humira  Info Not Available  Non Drug Allergy

 

 Enbrel  Info Not Available  Non Drug Allergy







Encounters







 Encounter  Location  Date

 

 [facility]  Mode Robledo MD  2015

 

 rx refill  Mode Robledo MD  2015

 

 [facility]  Mode Robledo MD  2015

 

 Refill- Sherine Robledo MD  2015

 

 1 mo f/u  Mode Rolbedo MD  2015

 

 3m f/u  Mode Robledo MD  2015

 

 3 mo f/u  Mode Robledo MD  2015

 

 Anthonyncjanine Robledo MD  2015

 

 Refill- Sherine Robledo MD  2015

 

 RX Request-- Sherine Robledo MD  2015

 

 lab order  Mode Robledo MD  2014

 

 Unknown  Mode Robledo MD  2014

 

 RX Request-- Sherine Robledo MD  2014

 

 Refill   hydrocodone  11/15  Mode Robledo MD  2014

 

 Unknown  Mode Robledo MD  May 06, 2014

 

 Refill- Sherine Robledo MD  May 29, 2014

 

 Refill-Sherine Robledo MD  2014

 

 1 mo f/u  Mode Robledo MD  May 12, 2015

 

 RX Request-- Sherine Robledo MD  May 06, 2015

 

 Refill  Mode Robledo MD  Dec 12, 2014

 

 F/U  Mode Robledo MD  Oct 15, 2014

 

 Unknown  Mode Robledo MD  Aug 06, 2015

 

 1 mo f/u  Mode Robledo MD  2015

 

 1 mo f/u  Mode Robledo MD  2015

 

 Unknown  Mode Robledo MD  2015

 

 MRI  Mode Robledo MD  May 14, 2015

 

 Refill- Orencia  Mode Robledo MD  2015

 

 refill  Mode Robledo MD  Aug 03, 2015

 

 Refill  Mode Robledo MD  Aug 03, 2015







Problems







 Problem Type  Condition  ICD-9 Code  Onset Dates  Condition Status

 

 Assessment  Rheumatoid arthritis of multiple  M06.09    Active



   sites without rheumatoid factor      

 

 Assessment  Other long term (current) drug  Z79.899    Active



   therapy      

 

 Problem  Lumbago  724.2    Active

 

 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

 

 Problem  Other long term (current) drug  Z79.899    Active



   therapy      

 

 Problem  Hypertension  997.91    Active

 

 Problem  Rheumatoid arthritis of multiple  714.0    Active



   sites without organ or system      



   involvement with positive      



   rheumatoid factor      

 

 Problem  Long-term (current) use of other  V58.69    Active



   medications - High Risk      

 

 Problem  Long-term (current) use of  V58.65    Active



   steroids      







Medications







 Medication  Code System  Code  Instructions  Start  End  Status  Dosage



         Date  Date    

 

 Folic Acid  [facility]  47628836706  1 Orally Once    May  Active  take 1



       a day    03,    tablet by



           2016    mouth once



               daily

 

 Methotrexate  [facility]  [address]-74  25 MG/ML SQ      Active  1 ml



 Sodium      injection Once        



       a week        

 

 Trazodone HCl  [facility]  [address]-88  150 MG Orally      Active  1 tablet



       at night        

 

 Calcium  [facility]  30864-46171  otc Orally      Active  1 tablet



       once a day        with meals

 

 Hydrocodone-Acet  [facility]  [address]-80   MG    Dec  Active  1 tablet



 aminophen      Orally every 6    05,    as needed



       hrs        

 

 Nuvigil  [facility]  [address]-90  250 MG Orally      Active  1 tablet



       Once a day        in the



               morning

 

 Multivitamins  [facility]  [address]-18   Orally once a      Active  1 tablet



       day        

 

 Nexium  [facility]  [address]-99  40 MG Orally      Active  1 capsule



       prn        

 

 Inderal LA  [facility]  [address]-30  160 MG Orally      Active  1 capsule



       Once a day        

 

 Tricor  [facility]  [address]-56  145 MG Orally      Active  1 tablet



       Once a day        

 

 Voltaren  [facility]  [address]-19  1 %    May  Active  as



       Transdermal    03,    directed



       PRN    2016    







Social History







 Social History Element  Qualifiers  Date Reported

 

 Illicit Drugs  .  none  2015

 

 Alcohol Screening:  .   Points: 0  2015

 

 Tobacco Use:  .   Are you a:: current smoker , How often do  2015



   you smoke cigarettes?: every day, How many  



   cigarettes a day do you smoke?: 11-20, How soon  



   after you wake up do you smoke your first  



   cigarette?: 6-30 min, Are you interested in  



   quitting?: Not ready to quit  

 

 Caffeine:  yes.  1-5  2015

 

 Exercise:  yes.  walk  2015

 

 Alcohol:  no.  2015







Vital Signs







 Date/Time:  2015

 

 Weight  202 lbs

 

 Height  72 in

 

 Temperature  96.7 F

 

 Cardiac Monitoring Heart Rate  64 /min

 

 Blood Pressure Diastolic  90 mm Hg

 

 Blood Pressure Systolic  130 mm Hg







Results







 

 

 CBC (INCLUDES DIFF/PLT)









 ABSOLUTE NEUTROPHILS(-4259-7158 cells/uL)  5457

 

 MPV(-7.5-11.5 fL)  9.7

 

 EOSINOPHILS(- %)  1.2

 

 HEMOGLOBIN(-13.2-17.1 g/dL)  13.8

 

 MONOCYTES(- %)  7.1

 

 HEMATOCRIT(-38.5-50.0 %)  42.0

 

 LYMPHOCYTES(- %)  19.4

 

 MCV(-80.0-100.0 fL)  96.2

 

 NEUTROPHILS(- %)  71.8

 

 MCH(-27.0-33.0 pg)  31.7

 

 ABSOLUTE BASOPHILS(-0-200 cells/uL)  38

 

 MCHC(-32.0-36.0 g/dL)  32.9

 

 BASOPHILS(- %)  0.5

 

 ABSOLUTE EOSINOPHILS(- cells/uL)  91

 

 RDW(-11.0-15.0 %)  14.7

 

 WHITE BLOOD CELL COUNT(-3.8-10.8 Thousand/uL)  7.6

 

 PLATELET COUNT(-140-400 Thousand/uL)  198

 

 ABSOLUTE MONOCYTES(-200-950 cells/uL)  540

 

 RED BLOOD CELL COUNT(-4.20-5.80 Million/uL)  4.36

 

 ABSOLUTE LYMPHOCYTES(-850-3900 cells/uL)  1474









 COMPREHENSIVE METABOLIC PANEL W/EGFR









 GLOBULIN(-1.9-3.7 g/dL (calc))  2.7

 

 eGFR AFRICAN AMERICAN(-> OR=60 mL/min/1.73m2)  84

 

 eGFR NON-AFR. AMERICAN(-> OR=60 mL/min/1.73m2)  72

 

 ALBUMIN(-3.6-5.1 g/dL)  4.1

 

 SODIUM(-135-146 mmol/L)  138

 

 PROTEIN, TOTAL(-6.1-8.1 g/dL)  6.8

 

 BUN/CREATININE RATIO(-6-22 (calc))  NOT APPLICABLE

 

 CALCIUM(-8.6-10.3 mg/dL)  9.6

 

 AST(-10-35 U/L)  17

 

 GLUCOSE(-65-99 mg/dL)  114

 

 ALKALINE PHOSPHATASE(- U/L)  36

 

 BILIRUBIN, TOTAL(-0.2-1.2 mg/dL)  0.4

 

 CREATININE(-0.70-1.33 mg/dL)  1.11

 

 ALBUMIN/GLOBULIN RATIO(-1.0-2.5 (calc))  1.5

 

 UREA NITROGEN (BUN)(-7-25 mg/dL)  20

 

 CARBON DIOXIDE(-19-30 mmol/L)  27

 

 ALT(-9-46 U/L)  13

 

 POTASSIUM(-3.5-5.3 mmol/L)  4.7

 

 CHLORIDE(- mmol/L)  106









 C-REACTIVE PROTEIN









 C-REACTIVE PROTEIN(-<0.80 mg/dL)  0.15









 RHEUMATOID FACTOR









 RHEUMATOID FACTOR(-<14 IU/mL)  34









 SED RATE BY MODIFIED WESTERGREN









 SED RATE BY MODIFIED WESTERGREN(-< OR=20 mm/h)  2







Summary Purpose

[employer]Clinical[facility] Submission

## 2018-12-17 NOTE — XMS REPORT
Mode Robledo MD

 Created on:2014



Patient:MARINO ALMODOVAR

Sex:Male

:1955

External Reference #:[id number]





Demographics







 Address  [address] [place] [id number]

 

 Phone  [phone]

 

 Preferred Language  Unknown

 

 Marital Status  Unknown

 

 Congregation Affiliation  Unknown

 

 Race  Unknown

 

 Ethnic Group  Unknown









Author







 Organization  [facility]









Care Team Providers







 Name  Role  Phone

 

 Mode Robledo  Provider Role  Unavailable









Encounters







 Encounter  Location  Date

 

 lab order  Mode Robledo MD  2014

 

 Unknown  Mode Robledo MD  2014

 

 RX Request-- Sherine Robledo MD  2014

 

 Refill   hydrocodone  11/15  Mode Robledo MD  2014

 

 Unknown  Mode Robledo MD  May 06, 2014

 

 Refill- Sherine Robledo MD  May 29, 2014

 

 Refill-Sherine Robledo MD  2014







Problems







 Problem Type  Condition  ICD-9 Code  Onset Dates  Condition Status

 

 Problem  Long-term (current) use of other  V58.69    Active



   medications - High Risk      

 

 Problem  Long-term (current) use of  V58.65    Active



   steroids      

 

 Problem  Rheumatoid arthritis  714.0    Active

 

 Problem  Hypertension  997.91    Active

 

 Assessment  Rheumatoid arthritis  714.0    Active







Medications







 Medication  Code System  Code  Instructions  Start  End Date  Status  Dosage



         Date      

 

 Hydrocodone-Ace  [facility]  92056-39   MG Orally    Dec 12,  Active  1 
tablet



 taminophen    16-30  every 6 hrs        as needed







Social History







 Social History Element  Qualifiers  Date Reported

 

 Illicit Drugs  .  none  Oct 15, 2014

 

 Tobacco Use:  .   Are you a:: current smoker  Oct 15, 2014

 

 Caffeine:  yes.  1-5  Oct 15, 2014

 

 Exercise:  yes.  walk  Oct 15, 2014

 

 Alcohol:  no.  Oct 15, 2014







Summary Purpose

[employer]Clinical[employer] Submission

## 2018-12-17 NOTE — XMS REPORT
Mode Robledo MD

 Created on:2016



Patient:MARINO ALMODOVAR

Sex:Male

:1955

External Reference #:[id number]





Demographics







 Address  [address] [place] [id number]

 

 Phone  [phone]

 

 Preferred Language  Unknown

 

 Marital Status  Unknown

 

 Ep[employer] Affiliation  Unknown

 

 Race  Unknown

 

 Ethnic Group  Unknown









Author







 Organization  [facility]









Care Team Providers







 Name  Role  Phone

 

 Mode Robledo  Provider Role  Unavailable









Encounters







 Encounter  Location  Date

 

 [facility]  Mode Robledo MD  2015

 

 3 MTH FU  Mode Robledo MD  2016

 

 actemra  Mode Robledo MD  2016

 

 orencia live Robledo MD  2016

 

 rx refill  Mode Robledo MD  2015

 

 [facility]  Mode Robledo MD  2015

 

 MRI/DEXA  Mode Robledo MD  2016

 

 Refill- Sherine Robledo MD  2015

 

 1 mo f/u  Mode Robledo MD  2015

 

 3m f/u  Mode Robledo MD  2015

 

 3 mo f/u  Mode Robledo MD  2015

 

 Orencjanine Robledo MD  2015

 

 Refill- Sherine Robledo MD  2015

 

 RX Request-- Sherine Robledo MD  2015

 

 lab order  Mode Robledo MD  2014

 

 Unknown  Mode Robledo MD  2014

 

 RX Request-- Sherine Robledo MD  2014

 

 Refill   hydrocodone  11/15  Mode Robledo MD  2014

 

 Unknown  Mode Robledo MD  May 06, 2014

 

 Refill- Sherine Robledo MD  May 29, 2014

 

 Refill-Sherine Robledo MD  2014

 

 1 mo f/u  Mode Robledo MD  May 12, 2015

 

 RX Request-- Sherine Robledo MD  May 06, 2015

 

 Refill  Mode Robledo MD  Dec 12, 2014

 

 F/U  Mode Robledo MD  Oct 15, 2014

 

 Unknown  Mode Robledo MD  Aug 06, 2015

 

 1 mo f/u  Mode Robledo MD  2015

 

 1 mo f/u  Mode Robledo MD  2015

 

 Unknown  Mode Robledo MD  2015

 

 MRI  Mode Robledo MD  May 14, 2015

 

 Refill- Orencia  Mode Robledo MD  2015

 

 refill  Mode Robledo MD  Aug 03, 2015

 

 Refill  Mode Robledo MD  Aug 03, 2015







Problems







 Problem Type  Condition  ICD-9 Code  Onset Dates  Condition Status

 

 Problem  Rheumatoid arthritis of multiple  714.0    Active



   sites without organ or system      



   involvement with positive      



   rheumatoid factor      

 

 Problem  Other long term (current) drug  Z79.899    Active



   therapy      

 

 Problem  Lumbago  724.2    Active

 

 Problem  Cigarette nicotine dependence,  F17.210    Active



   uncomplicated      

 

 Problem  Long-term (current) use of  V58.65    Active



   steroids      

 

 Problem  Hypertension  997.91    Active

 

 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

 

 Problem  Long-term (current) use of other  V58.69    Active



   medications - High Risk      







Social History







 Social History Element  Qualifiers  Date Reported

 

 Illicit Drugs  .  none  2016

 

 Alcohol Screening:  .   Points: 0  2016

 

 Tobacco Use:  .   Are you a:: current smoker , How often do  2016



   you smoke cigarettes?: every day, How many  



   cigarettes a day do you smoke?: 11-20, How soon  



   after you wake up do you smoke your first  



   cigarette?: 6-30 min, Are you interested in  



   quitting?: Not ready to quit  

 

 Caffeine:  yes.  1-5  2016

 

 Exercise:  yes.  walk  2016

 

 Alcohol:  no.  2016







Summary Purpose

[employer]ClinicalWorks Submission

## 2018-12-17 NOTE — XMS REPORT
Mode Robledo MD

 Created on:2018



Patient:MARINO ALMODOVAR

Sex:Male

:1955

External Reference #:[id number]





Demographics







 Address  [address] [place] [id number]

 

 Phone  [phone]

 

 Preferred Language  Unknown

 

 Marital Status  Unknown

 

 Christian Affiliation  Unknown

 

 Race  Unknown

 

 Ethnic Group  Unknown









Author







 Organization  [employer]









Care Team Providers







 Name  Role  Phone

 

 ChinChristine  Provider Role  Unavailable









Allergies, Adverse Reactions, Alerts







 Substance  Reaction  Event Type

 

 Lyrica  Info Not Available  Drug Allergy

 

 Orencia  Info Not Available  Non Drug Allergy

 

 Humira  Info Not Available  Non Drug Allergy

 

 Enbrel  Info Not Available  Non Drug Allergy







Problems







 Problem Type  Condition  Code  Onset Dates  Condition Status

 

 Assessment  Other chronic pain  G89.29    Active

 

 Assessment  Rheumatoid arthritis of multiple  M06.09    Active



   sites without rheumatoid factor      

 

 Assessment  Other long term (current) drug  Z79.899    Active



   therapy      

 

 Problem  Osteopenia  M85.80    Active

 

 Problem  Pain of right hand  M79.641    Active

 

 Problem  Other chronic pain  G89.29    Active

 

 Problem  Cigarette nicotine dependence,  F17.210    Active



   uncomplicated      

 

 Problem  Other long term (current) drug  Z79.899    Active



   therapy      

 

 Problem  Pain of left hand  M79.642    Active

 

 Problem  Rheumatoid arthritis of multiple  M06.09    Active



   sites without rheumatoid factor      







Medications







 Medication  Code  Code  Instructions  Start  End  Status  Dosage



   System      Date  Date    

 

 Calcium  [facility]  87015443282  otc Orally once      Active  1 tablet



       a day        with meals

 

 Medrol Dose Norris  [facility]  96089363387  4mg Orally once  Dec 07,    Active  as 
directed



       a day        

 

 Trazodone HCl  [facility]  87148707680  150 MG Orally      Active  1 tablet



       at night        

 

 Multivitamins  [facility]  37693143834   Orally once a      Active  1 tablet



       day        

 

 Inderal LA  [facility]  69299579641  160 MG Orally      Active  1 capsule



       Once a day        

 

 Voltaren  [facility]  09709148868  1 % Transdermal      Active  as directed



       PRN        

 

 Nuvigil  [facility]  98231452939  250 MG Orally      Active  1 tablet in



       Once a day        the morning

 

 Tricor  [facility]  85228847279  145 MG Orally      Active  1 tablet



       Once a day        

 

 Xeljanz XR  [facility]  15033593167  11 MG      Active  TAKE 1



               TABLET BY



               MOUTH EVERY



               DAY

 

 Hydrocodone-Acet  [facility]  19127735915   MG      Active  1 tab



 aminophen      Orally BID to        



       QID as needed        

 

 Propranolol HCl  [facility]  23849622103  10 MG Orally      Active  1 tablet



       Once a day        







Vital Signs







 Date/Time:  2018

 

 BMI  28.86 Index

 

 Weight  218.8 lbs

 

 Height  73 in

 

 Temperature  97.6 F

 

 Cardiac Monitoring Heart Rate  80 /min

 

 Blood Pressure Diastolic  88 mm Hg

 

 Blood Pressure Systolic  140 mm Hg







Results







 Name  Result  Date  Reference Range  Unit  Abnormality Flag

 

 9179 COMPREHENSIVE          



 METABOLIC PANEL          

 

 ----CALCIUM  9.5  95629204  8.5-10.5  MG/DL  

 

 ----CARBON DIOXIDE  26  2018  19-31  MEQ/L  

 

 ----ALT  8  19428396  5-50  U/L  

 

 ----CREATININE  1.06  58179822  0.80-1.40  MG/DL  

 

 ----AST  12  79029410  9-50  U/L  

 

 ---- eGFR   87  36753379  >60  ML/MIN/1.73  



 AMER.          

 

 ----ALKALINE  33  2018    U/L  L



 PHOSPHATASE          

 

 ---- eGFR  75  26246818  >60  ML/MIN/1.73  



 NON- AMER.          

 

 ----BILIRUBIN,  0.2  2018  <=1.2  MG/DL  



 TOTAL          

 

 ----CALC BUN/CREAT  21  09802299  6-28  RATIO  

 

 ----CALC A/G RATIO  1.4  03181842  1.0-2.6  RATIO  

 

 ----SODIUM  140  65120089  133-146  MEQ/L  

 

 ----CALC GLOBULIN  2.9  44180088  1.9-3.7  G/DL  

 

 ----POTASSIUM  4.9  44209002  3.5-5.4  MEQ/L  

 

 ----GLUCOSE  109  71309463  70-99  MG/DL  H

 

 ----CHLORIDE  103  42030482    MEQ/L  

 

 ----ALBUMIN  4.0  75356124  3.5-5.2  G/DL  

 

 ----BUN  22  2018  8-23  MG/DL  

 

 ----PROTEIN, TOTAL  6.9  60123261  6.1-8.3  G/DL  

 

 4530 COCCIDIOIDES          



 IMMITIS AB BY ID          

 

 ----COCCIDIOIDES  None Detected  2018  None Detected    



 IMMITIS AB BY ID          

 

 7505 QUANTIFERON TB          



 GOLD          

 

 ----   LISA-NIL  >10.00  71620282  >=0.5  IU/ML  

 

 ----QUANTIFERON TB  NEGATIVE  2018  NEGATIVE    



 GOLD          

 

 ----INTERFERON  --------------  55507211      



 GAMMA RESULTS  --------        

 

 ----   NIL  0.12  29042988  <=8.0  IU/ML  

 

 ----   TB-NIL  0.11  77945208  <0.35  IU/ML  

 

 4568 HISTOPLASMA          



 SPP. AB BY ID          

 

 ----HISTOPLASMA  None Detected  2018  None Detected    



 SPP. AB BY ID          

 

 9325 ACUTE          



 HEPATITIS PROFILE          

 

 ----INTERPRETATION  (NOTE)  2018      



 HEPATITIS B:          

 

 ----INTERPRETATION  (NOTE)  2018      



 HEPATITIS A:          

 

 ----HEPATITIS C  NON-REACTIVE  [id number]  NON-REACTIVE    



 ANTIBODY          

 

 ----HEPATITIS B  NON-REACTIVE  99951581  NON-REACTIVE    



 SURF AG          

 

 ----INTERPRETATION  (NOTE)  2018      



 HEPATITIS C:          

 

 ----HEPATITIS B  NON-REACTIVE  [id number]  NON-REACTIVE    



 CORE IgM          

 

 ----HEPATITIS A IgM  NON-REACTIVE  05146353  NON-REACTIVE    

 

 1000 CBC W/AUTO          



 DIFF          

 

 ----WBC  6.2  35222677  4.0-11.0  K/UL  

 

 ----RBC  4.56  50042813  4.10-5.70  M/UL  

 

 ----HEMOGLOBIN  13.5  04914717  13.0-17.0  G/DL  

 

 ----RDW  13.3  74166630  11.0-15.0  %  

 

 ----NEUTROPHILS  60.2  06358525  40.0-74.0  %  

 

 ----HEMATOCRIT  39.7  07335373  37.0-49.0  %  

 

 ----MCV  87.1  67944921  80.0-100.0  fL  

 

 ----MCH  29.6  88715656  27.0-34.0  PG  

 

 ----MCHC  34.0  82346063  32.0-35.5  G/DL  

 

 ----LYMPHOCYTES  27.9  36128267  19.0-48.0  %  

 

 ----MONOCYTES  9.5  49524835  4.0-13.0  %  

 

 ----EOSINOPHILS  1.8  60243343  0.0-7.0  %  

 

 ----BASOPHILS  0.6  96055890  0.0-2.0  %  

 

 ----PLATELET COUNT  233  23707879  130-400  K/UL  

 

 5083 HIGH          



 SENSITIVITY CRP          

 

 ----HIGH  50.5  45225680  SEE BELOW  MG/L  H



 SENSITIVITY CRP          

 

 1055 SEDIMENTATION          



 RATE          

 

 ----SEDIMENTATION  15  2018  0-15  MM/HOUR  



 RATE          







Summary Purpose

eClinicalWorks Submission

## 2018-12-17 NOTE — XMS REPORT
Mode Robledo MD

 Created on:2017



Patient:MARINO ALMODOVAR

Sex:Male

:1955

External Reference #:[id number]





Demographics







 Address  [address] [place] [id number]

 

 Phone  [phone]

 

 Preferred Language  Unknown

 

 Marital Status  Unknown

 

 Mormonism Affiliation  Unknown

 

 Race  Unknown

 

 Ethnic Group  Unknown









Author







 Organization  [employer]ClinicalWorks









Care Team Providers







 Name  Role  Phone

 

 Christine Chin  Provider Role  Unavailable









Allergies, Adverse Reactions, Alerts







 Substance  Reaction  Event Type

 

 Lyrica  Info Not Available  Drug Allergy

 

 Orencia  Info Not Available  Non Drug Allergy

 

 Humira  Info Not Available  Non Drug Allergy

 

 Enbrel  Info Not Available  Non Drug Allergy







Problems







 Problem Type  Condition  Code  Onset Dates  Condition Status

 

 Assessment  Other long term (current) drug  Z79.899    Active



   therapy      

 

 Assessment  Chronic prescription opiate use  Z79.891    Active

 

 Problem  Pain of right hand  M79.641    Active

 

 Problem  Pain of left hand  M79.642    Active

 

 Problem  Osteopenia  M85.80    Active

 

 Problem  Other long term (current) drug  Z79.899    Active



   therapy      

 

 Assessment  Rheumatoid arthritis of multiple  M06.09    Active



   sites without rheumatoid factor      

 

 Problem  Rheumatoid arthritis of multiple  M06.09    Active



   sites without rheumatoid factor      

 

 Problem  Cigarette nicotine dependence,  F17.210    Active



   uncomplicated      







Medications







 Medication  Code  Code  Instructions  Start  End  Status  Dosage



   System      Date  Date    

 

 Calcium  [facility]  46392-55  otc Orally once      Active  1 tablet



     034  a day        with meals

 

 Voltaren  [facility]  96286-21  1 % Transdermal      Active  as directed



     27-01  PRN        

 

 Tricor  [facility]  34915-56  145 MG Orally      Active  1 tablet



     23-90  Once a day        

 

 Nuvigil  [facility]  47136-75  250 MG Orally      Active  1 tablet in



     94-30  Once a day        the morning

 

 Trazodone HCl  [facility]  83434-98  150 MG Orally at      Active  1 tablet



     73-01  night        

 

 Hydrocodone-Aceta  [facility]  53608-88   MG Orally      Active  1 tablet as



 minophen    16-30  every 6 hrs        needed

 

 Xeljanz XR  [facility]  54748-25  11 MG Orally      Active  1 tablet



     01-30  Once a day        

 

 Nexium  [facility]  37737-63  40 MG Orally prn      Active  1 capsule



     40-31          

 

 Methotrexate  [facility]  92593-39  25 MG/ML      Active  1 ml



 Sodium    50-10  Injection Once a        



       week        

 

 Multivitamins  [facility]  99013-91   Orally once a      Active  1 tablet



     46-10  day        

 

 Propranolol HCl  [facility]  91435-72  10 MG Orally      Active  1 tablet



     82-01  Once a day        

 

 Inderal LA  [facility]  90395-77  160 MG Orally      Active  1 capsule



     79-81  Once a day        

 

 Folic Acid  [facility]  62538-76  1 MG Orally Once      Active  1 tablet



     07-19  a day        







Vital Signs







 Date/Time:  2017

 

 BMI  27.95 Index

 

 Weight  209 lbs

 

 Height  72.5 in

 

 Temperature  97.4 F

 

 Cardiac Monitoring Heart Rate  72 /min

 

 Blood Pressure Diastolic  90 mm Hg

 

 Blood Pressure Systolic  140 mm Hg







Results

No Known Results



Summary Purpose

eClinicalWorks Submission

## 2018-12-17 NOTE — XMS REPORT
Mode Robledo MD

 Created on:2015



Patient:MARINO ALMODOVAR

Sex:Male

:1955

External Reference #:[id number]





Demographics







 Address  [address] [place] [id number]

 

 Phone  [phone]

 

 Preferred Language  Unknown

 

 Marital Status  Unknown

 

 Nondenominational Affiliation  Unknown

 

 Race  Unknown

 

 Ethnic Group  Unknown









Author







 Organization  [employer]









Care Team Providers







 Name  Role  Phone

 

 Alina Chinnna  Provider Role  Unavailable









Allergies, Adverse Reactions, Alerts







 Substance  Reaction  Event Type

 

 Lyrica  Info Not Available  Drug Allergy

 

 Humira  Info Not Available  Non Drug Allergy

 

 Enbrel  Info Not Available  Non Drug Allergy







Encounters







 Encounter  Location  Date

 

 lab order  Mode Robledo MD  2014

 

 Unknown  Mode Robledo MD  2014

 

 RX Request-- Sherine Robledo MD  2014

 

 Refill   hydrocodone  11/15  Mode Robledo MD  2014

 

 Unknown  Mode Robledo MD  May 06, 2014

 

 Refill- Sherine Robledo MD  May 29, 2014

 

 Refill-Sherine Robledo MD  2014

 

 Refill  Mode Robledo MD  Dec 12, 2014

 

 F/U  Mode Robledo MD  Oct 15, 2014







Problems







 Problem Type  Condition  ICD-9 Code  Onset Dates  Condition Status

 

 Assessment  Long-term (current) use of other  V58.69    Active



   medications - High Risk      

 

 Assessment  Long-term (current) use of  V58.65    Active



   steroids      

 

 Assessment  Rheumatoid arthritis  714.0    Active

 

 Assessment  Need for prophylactic  V04.81    Active



   vaccination and inoculation,      



   Influenza      







Social History







 Social History Element  Qualifiers  Date Reported

 

 Illicit Drugs  .  none  2015

 

 Tobacco Use:  .   Are you a:: current smoker  2015

 

 Caffeine:  yes.  1-5  2015

 

 Exercise:  yes.  walk  2015

 

 Alcohol:  no.  2015







Vital Signs







 Date/Time:  Oct 15, 2014

 

 Weight  203 lbs

 

 Height  72 in

 

 Temperature  97.0 F

 

 Cardiac Monitoring Heart Rate  76 /min

 

 Blood Pressure Diastolic  84 mm Hg

 

 Blood Pressure Systolic  146 mm Hg







Immunizations







 Vaccine  Administration Date

 

 Flu Vaccine  Oct 15, 2014







Summary Purpose

[employer] Submission

## 2018-12-17 NOTE — XMS REPORT
Mode Robledo MD

 Created on:2017



Patient:MARINO ALMODOVAR

Sex:Male

:1955

External Reference #:[id number]





Demographics







 Address  [address] [place] [id number]

 

 Phone  [phone]

 

 Preferred Language  Unknown

 

 Marital Status  Unknown

 

 Jain Affiliation  Unknown

 

 Race  Unknown

 

 Ethnic Group  Unknown









Author







 [facility]









Care Team Providers







 Name  Role  Phone

 

 Christine Chin  Provider Role  Unavailable









Allergies, Adverse Reactions, Alerts







 Substance  Reaction  Event Type

 

 Lyrica  Info Not Available  Drug Allergy

 

 Orencia  Info Not Available  Non Drug Allergy

 

 Humira  Info Not Available  Non Drug Allergy

 

 Enbrel  Info Not Available  Non Drug Allergy







Encounters







 Encounter  Location  Date

 

 [facility]  Mode Robledo MD  2015

 

 3 MTH CHRISTINA Robledo MD  2016

 

 actinga Robledo MD  2016

 

 deanne Robledo MD  2016

 

 Actemra rx  Mode Robledo MD  Aug 02, 2016

 

 rx refill  Mode Robledo MD  2015

 

 DEXA  Mode Robledo MD  Oct 03, 2016

 

 [facility]  Mode Robledo MD  2015

 

 MRI/DEXA  Mode Robledo MD  2016

 

 Refill- Deanne Robledo MD  2015

 

 3 MTH CHRISTINA Robledo MD  2016

 

 1 mo f/u  Mode Robledo MD  2015

 

 3 MTH CHRISTINA Robledo MD  2016

 

 3m f/u  Mode Robledo MD  2015

 

 3 mo f/u  Mode Robledo MD  2015

 

 3 MTH CHRISTINA Robledo MD  Oct 11, 2016

 

 Deanne Robledo MD  2015

 

 xeljanz rx  Mode Robledo MD  Oct 27, 2016

 

 Refill- Deanne Robledo MD  2015

 

 RX Request-- Deanne Robledo MD  2015

 

 lab order  Mode Robledo MD  2014

 

 Unknown  Mode Robledo MD  2014

 

 RX Request-- Deanne Robledo MD  2014

 

 Refill   hydrocodone  11/15  Mode Robledo MD  2014

 

 Unknown  Mode Robledo MD  May 06, 2014

 

 Refill- Deanne Robledo MD  May 29, 2014

 

 Refill-Deanne Robledo MD  2014

 

 1 mo f/u  Mode Robledo MD  May 12, 2015

 

 RX Request-- Deanne Robledo MD  May 06, 2015

 

 Refill  Mode Robledo MD  Dec 12, 2014

 

 3 MTH FU  Mode Robledo MD  2017

 

 F/U  Mode Robledo MD  Oct 15, 2014

 

 Unknown  Mode Robledo MD  Aug 06, 2015

 

 1 mo f/u  Mode Robledo MD  2015

 

 1 mo f/u  Mode Robledo MD  2015

 

 Unknown  Mode Robledo MD  2015

 

 MRI  Mode Robledo MD  May 14, 2015

 

 Refill- Deanne Robledo MD  2015

 

 refill  Mode Robledo MD  Aug 03, 2015

 

 Refill  Mode Robledo MD  Aug 03, 2015







Problems







 Problem Type  Condition  ICD-9 Code  Onset Dates  Condition Status

 

 Assessment  Osteopenia  M85.80    Active

 

 Assessment  Other long term (current) drug  Z79.899    Active



   therapy      

 

 Assessment  Chronic prescription opiate use  Z79.891    Active

 

 Problem  Pain of right hand  M79.641    Active

 

 Problem  Pain of left hand  M79.642    Active

 

 Problem  Osteopenia  M85.80    Active

 

 Problem  Other long term (current) drug  Z79.899    Active



   therapy      

 

 Assessment  Rheumatoid arthritis of multiple  M06.09    Active



   sites without rheumatoid factor      

 

 Problem  Rheumatoid arthritis of multiple  M06.09    Active



   sites without rheumatoid factor      

 

 Problem  Cigarette nicotine dependence,  F17.210    Active



   uncomplicated      







Medications







 Medication  Code System  Code  Instructions  Start  End Date  Status  Dosage



         Date      

 

 Trazodone HCl  MEDISPAN  00378-3  150 MG Orally      Active  1 tablet



     473-01  at night        

 

 Xeljanz XR  MEDISPAN  13709-1  11 MG Orally  Oct 27,    Active  1 tablet



     501-30  Once a day        

 

 Methotrexate  MEDISPAN  55702-9  25 MG/ML      Active  1 ml



 Sodium    350-10  Injection Once        



       a week        

 

 Voltaren  MEDISPAN  87389-6  1 % Transdermal      Active  as directed



     427-01  PRN        

 

 Multivitamins  MEDISPAN  15240-1   Orally once a      Active  1 tablet



     046-10  day        

 

 Tricor  MEDISPAN  63062-8  145 MG Orally      Active  1 tablet



     123-90  Once a day        

 

 Nexium  MEDISPAN  19054-5  40 MG Orally      Active  1 capsule



     040-31  prn        

 

 Folic Acid  MEDISPAN  20630-2  1 MG Orally    April  Active  1 tablet



     507-19  Once a day    2017    once a day



               orally 90



               days

 

 Propranolol HCl  MEDISPAN  57084-8  10 MG Orally      Active  1 tablet



     182-01  Once a day        

 

 Hydrocodone-Aceta  MEDISPAN  03270-7   MG      Active  1 tablet as



 minophen    116-30  Orally every 6        needed



       hrs        

 

 Nuvigil  MEDISPAN  48889-1  250 MG Orally      Active  1 tablet in



     394-30  Once a day        the morning

 

 Calcium  MEDISPAN  53168-2  otc Orally once      Active  1 tablet



     5034  a day        with meals

 

 Inderal LA  MEDISPAN  49122-1  160 MG Orally      Active  1 capsule



     479-81  Once a day        







Social History







 Social History Element  Qualifiers  Date Reported

 

 Illicit Drugs  .  none  2017

 

 Alcohol Screening:  .   Points: 0  2017

 

 Tobacco Use:  .   Are you a:: current smoker , How often do  2017



   you smoke cigarettes?: every day, How many  



   cigarettes a day do you smoke?: 11-20, How soon  



   after you wake up do you smoke your first  



   cigarette?: 6-30 min, Are you interested in  



   quitting?: Not ready to quit  

 

 Caffeine:  yes.  1-5  2017

 

 Exercise:  yes.  walk  2017

 

 Alcohol:  no.  2017







Vital Signs







 Date/Time:  2017

 

 Weight  204.3 lbs

 

 Height  73 in

 

 Temperature  97.4 F

 

 Cardiac Monitoring Heart Rate  64 /min

 

 Blood Pressure Diastolic  90 mm Hg

 

 Blood Pressure Systolic  142 mm Hg







Summary Purpose

[employer]Clinical[employer] Submission

## 2018-12-17 NOTE — XMS REPORT
Mode Robledo MD

 Created on:2014



Patient:MARINO ALMODOVAR

Sex:Male

:1955

External Reference #:[id number]





Demographics







 Address  [address] [place] [id number]

 

 Phone  [phone]

 

 Preferred Language  Unknown

 

 Marital Status  Unknown

 

 Christian Affiliation  Unknown

 

 Race  Unknown

 

 Ethnic Group  Unknown









Author







 Organization  [employer]









Care Team Providers







 Name  Role  Phone

 

 Mode Robledo  Provider Role  Unavailable









Encounters







 Encounter  Location  Date

 

 lab order  Mode Robledo MD  2014

 

 Unknown  Mode Robledo MD  2014

 

 RX Request-- Sherine Robledo MD  2014

 

 Unknown  Mode Robledo MD  May 06, 2014

 

 Refill- Sherine Robledo MD  May 29, 2014

 

 Refill-Sherine Robledo MD  2014







Problems







 Problem Type  Condition  ICD-9 Code  Onset Dates  Condition Status

 

 Problem  Long-term (current) use of other  V58.69    Active



   medications - High Risk      

 

 Problem  Long-term (current) use of  V58.65    Active



   steroids      

 

 Problem  Rheumatoid arthritis  714.0    Active

 

 Problem  Hypertension  997.91    Active

 

 Assessment  Rheumatoid arthritis  714.0    Active







Medications







 Medication  Code System  Code  Instructions  Start Date  End Date  Status  
Dosage

 

 ORENCIA SQ  Unknown  0  125MG SUBCUTANEOUSLY    ,  Active  125mg



       ONCE A WEEK        







Social History







 Social History Element  Qualifiers  Date Reported

 

 Illicit Drugs  .  none  Oct 15, 2014

 

 Tobacco Use:  .   Are you a:: current smoker  Oct 15, 2014

 

 Caffeine:  yes.  1-5  Oct 15, 2014

 

 Exercise:  yes.  walk  Oct 15, 2014

 

 Alcohol:  no.  Oct 15, 2014







Summary Purpose

[employer] Submission

## 2018-12-17 NOTE — XMS REPORT
Mode Robledo MD

 Created on:May 8, 2015



Patient:MARINO ALMODOVAR

Sex:Male

:1955

External Reference #:[id number]





Demographics







 Address  [address] [place] [id number]

 

 Phone  [phone]

 

 Preferred Language  Unknown

 

 Marital Status  Unknown

 

 Uatsdin Affiliation  Unknown

 

 Race  Unknown

 

 Ethnic Group  Unknown









Author







 Organization  [facility]









Care Team Providers







 Name  Role  Phone

 

 Mode Robledo  Provider Role  Unavailable









Encounters







 Encounter  Location  Date

 

 lab order  Mode Robledo MD  2014

 

 Unknown  Mode Robledo MD  2014

 

 RX Request-- Sherine Robledo MD  2014

 

 Refill   hydrocodone  11/15  Mode Robledo MD  2014

 

 Unknown  Mode Robledo MD  May 06, 2014

 

 Refill- Sherine Robledo MD  May 29, 2014

 

 Refill-Sherine Robledo MD  2014

 

 MRI Bi Hands  Mode Robledo MD  2015

 

 1 mo f/u  Mode Robledo MD  May 12, 2015

 

 RX Request-- Sherine Robledo MD  May 06, 2015

 

 Refill  Mode Robledo MD  Dec 12, 2014

 

 F/U  Mode Robledo MD  Oct 15, 2014

 

 rx refill  Mode Robledo MD  2015

 

 MRI Bi Hands  Mode Robledo MD  2015

 

 Refill- Sherine Robledo MD  2015

 

 1 mo f/u  Mode Robledo MD  2015

 

 3m f/u  Mode Robledo MD  2015

 

 3 mo f/u  Mode Robledo MD  2015

 

 Amyia Irvin Robledo MD  2015

 

 Refill- Sherine Robledo MD  2015

 

 RX Request-- Sherine Robledo MD  2015







Problems







 Problem Type  Condition  ICD-9 Code  Onset Dates  Condition Status

 

 Problem  Rheumatoid arthritis  714.0    Active

 

 Problem  Long-term (current) use of other  V58.69    Active



   medications - High Risk      

 

 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

 

 Problem  Long-term (current) use of  V58.65    Active



   steroids      

 

 Problem  Hypertension  997.91    Active







Medications







 Medication  Code System  Code  Instructions  Start Date  End Date  Status  
Dosage

 

 ORENCIA SQ  Unknown  0  125MG SUBCUTANEOUSLY    Aug 04,  Active  125mg



       ONCE A WEEK        







Social History







 Social History Element  Qualifiers  Date Reported

 

 Illicit Drugs  .  none  2015

 

 Alcohol Screening:  .   Points: 0  2015

 

 Tobacco Use:  .   Are you a:: current smoker , How often do  2015



   you smoke cigarettes?: every day, How many  



   cigarettes a day do you smoke?: 11-20, How soon  



   after you wake up do you smoke your first  



   cigarette?: 6-30 min, Are you interested in  



   quitting?: Not ready to quit  

 

 Caffeine:  yes.  1-5  2015

 

 Exercise:  yes.  walk  2015

 

 Alcohol:  no.  2015







Summary Purpose

[employer]ClinicalWorks Submission

## 2018-12-17 NOTE — XMS REPORT
Mode Robledo MD

 Created on:February 3, 2015



Patient:MARINO ALMODOVAR

Sex:Male

:1955

External Reference #:[id number]





Demographics







 Address  [address] [place] [id number]

 

 Phone  [phone]

 

 Preferred Language  Unknown

 

 Marital Status  Unknown

 

 Bahai Affiliation  Unknown

 

 Race  Unknown

 

 Ethnic Group  Unknown









Author







 Organization  [facility]









Care Team Providers







 Name  Role  Phone

 

 Christine Chin  Provider Role  Unavailable









Allergies, Adverse Reactions, Alerts







 Substance  Reaction  Event Type

 

 Lyrica  Info Not Available  Drug Allergy

 

 Humira  Info Not Available  Non Drug Allergy

 

 Enbrel  Info Not Available  Non Drug Allergy







Encounters







 Encounter  Location  Date

 

 lab order  Mode Robledo MD  2014

 

 Unknown  Mode Robledo MD  2014

 

 RX Request-- Sherine Robledo MD  2014

 

 Refill   hydrocodone  11/15  Mode Robledo MD  2014

 

 Unknown  Mode Robledo MD  May 06, 2014

 

 Refill- Sherine Robledo MD  May 29, 2014

 

 Refill-Sherine Robledo MD  2014

 

 MRI Bi Hands  Mode Robledo MD  2015

 

 Refill  Mode Robledo MD  Dec 12, 2014

 

 F/U  Mode Robledo MD  Oct 15, 2014

 

 3m f/u  Mode Robledo MD  2015

 

 Sherine Robledo MD  2015

 

 Refill- Sherine Robledo MD  2015







Problems







 Problem Type  Condition  ICD-9 Code  Onset Dates  Condition Status

 

 Problem  Long-term (current) use of other  V58.69    Active



   medications - High Risk      

 

 Problem  Long-term (current) use of  V58.65    Active



   steroids      

 

 Problem  Rheumatoid arthritis  714.0    Active

 

 Assessment  Long-term (current) use of other  V58.69    Active



   medications - High Risk      

 

 Assessment  Long-term (current) use of  V58.65    Active



   steroids      

 

 Problem  Hypertension  997.91    Active

 

 Assessment  Rheumatoid arthritis  714.0    Active







Medications







 Medication  Code System  Code  Instructions  Start  End  Status  Dosage



         Date  Date    

 

 Trazodone HCl  MEDISPAN  00378-3  150 MG Orally at      Active  1 tablet



     473-01  night        

 

 Calcium  MEDISPAN  65877-9  otc Orally once a      Active  1 tablet



     5034  day        with meals

 

 PredniSONE  MEDISPAN  62127-3  5 MG Orally Once      Active  1 tablet



     390-21  a day        

 

 Nexium  MEDISPAN  01485-9  40 MG Orally once      Active  1 capsule



     040-31  a day        

 

 Folic Acid  MEDISPAN  19985-1  1 MG Orally Once      Active  1 tablet



     507-19  a day        

 

 Voltaren  MEDISPAN  34412-3  1 % Transdermal      Active  as directed



     215-05  Three times a day        

 

 Inderal LA  MEDISPAN  20989-4  160 MG Orally      Active  1 capsule



     479-81  Once a day        

 

 Tricor  MEDISPAN  03536-2  145 MG Orally      Active  1 tablet



     123-90  Once a day        

 

 Methotrexate  MEDISPAN  52588-2  25 MG/ML SQ      Active  1 ml



 Sodium    173-00  injection Once a        



       week        

 

 Multivitamins  MEDISPAN  19569-0   Orally once a      Active  1 tablet



     046-10  day        

 

 ORENCIA SQ  Unknown  0  125MG      Active  125mg



       SUBCUTANEOUSLY        



       ONCE A WEEK        

 

 Hydrocodone-Aceta  MEDISPAN  27256-8   MG Orally      Active  1 tablet as



 minophen    367-01  every 6 hrs        needed







Social History







 Social History Element  Qualifiers  Date Reported

 

 Illicit Drugs  .  none  2015

 

 Tobacco Use:  .   Are you a:: current smoker  2015

 

 Caffeine:  yes.  1-5  2015

 

 Exercise:  yes.  walk  2015

 

 Alcohol:  no.  2015







Vital Signs







 Date/Time:  2015

 

 Weight  204 lbs

 

 Height  72 in

 

 Temperature  97.6 F

 

 Cardiac Monitoring Heart Rate  72 /min

 

 Blood Pressure Diastolic  70 mm Hg

 

 Blood Pressure Systolic  136 mm Hg







Immunizations







 Vaccine  Administration Date

 

 Depomedrol  2015







Summary Purpose

eClinicalWorks Submission

## 2018-12-17 NOTE — XMS REPORT
Mode Robledo MD

 Created on:2014



Patient:MARINO ALMODOVAR

Sex:Male

:1955

External Reference #:[id number]





Demographics







 Address  [address] [place] [id number]

 

 Phone  [phone]

 

 Preferred Language  Unknown

 

 Marital Status  Unknown

 

 Buddhism Affiliation  Unknown

 

 Race  Unknown

 

 Ethnic Group  Unknown









Author







 Organization  [employer]









Care Team Providers







 Name  Role  Phone

 

 Mode Robledo  Provider Role  Unavailable









Encounters







 Encounter  Location  Date

 

 lab order  Mode Robledo MD  2014

 

 Unknown  Mode Robledo MD  May 06, 2014

 

 Refill- Sherine Robledo MD  May 29, 2014

 

 Refill-Sherine Robledo MD  2014







Problems







 Problem Type  Condition  ICD-9 Code  Onset Dates  Condition Status

 

 Problem  Long-term (current) use of other  V58.69    Active



   medications - High Risk      

 

 Problem  Long-term (current) use of  V58.65    Active



   steroids      

 

 Problem  Rheumatoid arthritis  714.0    Active

 

 Problem  Hypertension  997.91    Active

 

 Assessment  Rheumatoid arthritis  714.0    Active







Social History







 Social History Element  Qualifiers  Date Reported

 

 Illicit Drugs  .  none  2014

 

 Tobacco Use:  .   Are you a:: current smoker  2014

 

 Caffeine:  yes.  1-5  2014

 

 Exercise:  yes.  walk  2014

 

 Alcohol:  no.  2014







Summary Purpose

[employer]ClinicalWorks Submission

## 2018-12-17 NOTE — XMS REPORT
Mode Robledo MD

 Created on:2016



Patient:MARINO ALMODOVAR

Sex:Male

:1955

External Reference #:[id number]





Demographics







 Address  [address] [place] [id number]

 

 Phone  [phone]

 

 Preferred Language  Unknown

 

 Marital Status  Unknown

 

 Orthodoxy Affiliation  Unknown

 

 Race  Unknown

 

 Ethnic Group  Unknown









Author







 [facility]









Care Team Providers







 Name  Role  Phone

 

 Mode Robledo  Provider Role  Unavailable









Encounters







 Encounter  Location  Date

 

 MRI Bi Hands  Mode Robledo MD  2015

 

 deanne Robledo MD  2016

 

 rx refill  Mode Robledo MD  2015

 

 MRI Bi Hands  Mode Robledo MD  2015

 

 Refill- Deanne Robledo MD  2015

 

 1 mo f/u  Mode Robledo MD  2015

 

 3m f/u  Mode Robledo MD  2015

 

 3 mo f/u  Mode Robledo MD  2015

 

 Deanne Robledo MD  2015

 

 Refill- Deanne Robledo MD  2015

 

 RX Request-- Deanne Robledo MD  2015

 

 lab order  Mode Robledo MD  2014

 

 Unknown  Mode Robledo MD  2014

 

 RX Request-- Deanne Robledo MD  2014

 

 Refill   hydrocodone  11/15  Mode Robledo MD  2014

 

 Unknown  Mode Robledo MD  May 06, 2014

 

 Refill- Deanne Robledo MD  May 29, 2014

 

 Refill-Deanne Robledo MD  2014

 

 1 mo f/u  Mode Robledo MD  May 12, 2015

 

 RX Request-- Deanne Robledo MD  May 06, 2015

 

 Nellieill  Mode Robledo MD  Dec 12, 2014

 

 F/NEGAR Robledo MD  Oct 15, 2014

 

 Unknown  Mode Robledo MD  Aug 06, 2015

 

 1 mo f/u  Mode Robledo MD  2015

 

 1 mo f/u  Mode Robledo MD  2015

 

 Unknown  Mode Robledo MD  2015

 

 MRI  Mode Robledo MD  May 14, 2015

 

 Refill- Orencia  Mode Robledo MD  2015

 

 refill  Mode Robledo MD  Aug 03, 2015

 

 Refill  Mode Robledo MD  Aug 03, 2015







Problems







 Problem Type  Condition  ICD-9 Code  Onset Dates  Condition Status

 

 Problem  Rheumatoid arthritis of multiple  714.0    Active



   sites without organ or system      



   involvement with positive      



   rheumatoid factor      

 

 Problem  Other long term (current) drug  Z79.899    Active



   therapy      

 

 Problem  Lumbago  724.2    Active

 

 Problem  Cigarette nicotine dependence,  F17.210    Active



   uncomplicated      

 

 Problem  Long-term (current) use of  V58.65    Active



   steroids      

 

 Problem  Hypertension  997.91    Active

 

 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

 

 Problem  Long-term (current) use of other  V58.69    Active



   medications - High Risk      







Medications







 Medication  Code System  Code  Instructions  Start Date  End Date  Status  
Dosage

 

 ORENCIA SQ  Unknown  0  125MG SUBCUTANEOUSLY  2016    Active  125mg



       ONCE A WEEK        







Social History







 Social History Element  Qualifiers  Date Reported

 

 Illicit Drugs  .  none  2016

 

 Alcohol Screening:  .   Points: 0  2016

 

 Tobacco Use:  .   Are you a:: current smoker , How often do  2016



   you smoke cigarettes?: every day, How many  



   cigarettes a day do you smoke?: 11-20, How soon  



   after you wake up do you smoke your first  



   cigarette?: 6-30 min, Are you interested in  



   quitting?: Not ready to quit  

 

 Caffeine:  yes.  1-5  2016

 

 Exercise:  yes.  walk  2016

 

 Alcohol:  no.  2016







Summary Purpose

eClinicalWorks Submission

## 2018-12-17 NOTE — XMS REPORT
Mode Robledo MD

 Created on:2018



Patient:MARINO ALMODOVAR

Sex:Male

:1955

External Reference #:[id number]





Demographics







 Address  [address] [place] [address]

 

 Phone  [phone]

 

 Preferred Language  Unknown

 

 Marital Status  Unknown

 

 Sabianism Affiliation  Unknown

 

 Race  Unknown

 

 Ethnic Group  Unknown









Author







 Organization  [employer]









Care Team Providers







 Name  Role  Phone

 

 Christine Chin  Provider Role  Unavailable









Allergies, Adverse Reactions, Alerts







 Substance  Reaction  Event Type

 

 Lyrica  Info Not Available  Drug Allergy

 

 Orencia  Info Not Available  Non Drug Allergy

 

 Humira  Info Not Available  Non Drug Allergy

 

 Enbrel  Info Not Available  Non Drug Allergy







Problems







 Problem Type  Condition  Code  Onset Dates  Condition Status

 

 Assessment  Other long term (current) drug  Z79.899    Active



   therapy      

 

 Assessment  Rheumatoid arthritis of multiple  M06.09    Active



   sites without rheumatoid factor      

 

 Assessment  Other chronic pain  G89.29    Active

 

 Problem  Osteopenia  M85.80    Active

 

 Problem  Pain of right hand  M79.641    Active

 

 Problem  Other chronic pain  G89.29    Active

 

 Problem  Cigarette nicotine dependence,  F17.210    Active



   uncomplicated      

 

 Problem  Other long term (current) drug  Z79.899    Active



   therapy      

 

 Problem  Pain of left hand  M79.642    Active

 

 Problem  Rheumatoid arthritis of multiple  M06.09    Active



   sites without rheumatoid factor      







Medications







 Medication  Code  Code  Instructions  Start  End Date  Status  Dosage



   System      Date      

 

 Medrol Dose Norris  NDC  03154220929  4mg Orally once  Dec 07,    Active  as



       a day        directed

 

 Propranolol HCl  NDC  08853168619  10 MG Orally      Active  1 tablet



       Once a day        

 

 Multivitamins  NDC  68321382815   Orally once a      Active  1 tablet



       day        

 

 Trazodone HCl  NDC  10842410241  150 MG Orally      Active  1 tablet



       at night        

 

 Voltaren  NDC  48352734473  1 % Transdermal      Active  as



       PRN        directed

 

 Nuvigil  NDC  91820716677  250 MG Orally      Active  1 tablet



       Once a day        in the



               morning

 

 Tricor  NDC  30048969914  145 MG Orally      Active  1 tablet



       Once a day        

 

 Xeljanz XR  NDC  02317821908  11 MG      Active  TAKE 1



               TABLET BY



               MOUTH



               EVERY DAY

 

 Hydrocodone-Acet  ND  99965613051   MG    March  Active  1 tab



 aminophen      Orally BID to    2019    



       QID as needed        

 

 Calcium  NDC  15697242913  otc Orally once      Active  1 tablet



       a day        with meals

 

 Inderal LA  ND  11676239371  160 MG Orally      Active  1 capsule



       Once a day        







Vital Signs







 Date/Time:  Dec 06, 2018

 

 BMI  28.69 Index

 

 Weight  217.5 lbs

 

 Height  73 in

 

 Temperature  97.0 F

 

 Cardiac Monitoring Heart Rate  68 /min

 

 Blood Pressure Diastolic  98 mm Hg

 

 Blood Pressure Systolic  158 mm Hg







Results

No Known Results



Summary Purpose

[employer] Submission

## 2018-12-17 NOTE — XMS REPORT
Mode Robledo MD

 Created on:2016



Patient:MARINO ALMODOVAR

Sex:Male

:1955

External Reference #:[id number]





Demographics







 Address  [address] [place] [id number]

 

 Phone  [phone]

 

 Preferred Language  Unknown

 

 Marital Status  Unknown

 

 Jehovah's witness Affiliation  Unknown

 

 Race  Unknown

 

 Ethnic Group  Unknown









Author







 [facility]









Care Team Providers







 Name  Role  Phone

 

 Mode Robledo  Provider Role  Unavailable









Encounters







 Encounter  Location  Date

 

 MRI Bi Hands  Mode Robledo MD  2015

 

 3 MTH FU  Mode Robledo MD  2016

 

 actemra  Mode Robledo MD  2016

 

 orencjanine Robledo MD  2016

 

 Actemra rx  Mode Robledo MD  Aug 02, 2016

 

 rx avelill  Mode Robledo MD  2015

 

 DEXA  Mode Robledo MD  Oct 03, 2016

 

 MRI Bi Hands  Mode Robledo MD  2015

 

 MRI/RITAA  Mode Robledo MD  2016

 

 Refill- Sherine Robledo MD  2015

 

 3 MTH FU  Mode Robledo MD  2016

 

 1 mo f/u  Mode Robledo MD  2015

 

 3m f/u  Mode Robledo MD  2015

 

 3 mo f/u  Mode Robledo MD  2015

 

 Sherine Robledo MD  2015

 

 Refill- Sherine Robledo MD  2015

 

 RX Request-- Sherine Robledo MD  2015

 

 lab order  Mode Robledo MD  2014

 

 Unknown  Mode Robledo MD  2014

 

 RX Request-- Sherine Robledo MD  2014

 

 Refill   hydrocodone  11/15  Mode Robledo MD  2014

 

 Unknown  Mode Robledo MD  May 06, 2014

 

 Refill- Sherine Robledo MD  May 29, 2014

 

 Refill-Sherine Robledo MD  2014

 

 1 mo f/u  Mode Robledo MD  May 12, 2015

 

 RX Request-- Sherine Robledo MD  May 06, 2015

 

 Refill  Mode Robledo MD  Dec 12, 2014

 

 F/U  Mode Robledo MD  Oct 15, 2014

 

 Unknown  Mode Robledo MD  Aug 06, 2015

 

 1 mo f/u  Mode Robledo MD  2015

 

 1 mo f/u  Mode Robledo MD  2015

 

 Unknown  Mode Robledo MD  2015

 

 MRI  Mode Robledo MD  May 14, 2015

 

 Refill- Sherine Robledo MD  2015

 

 refill  Mode Robledo MD  Aug 03, 2015

 

 Refill  Mode Robledo MD  Aug 03, 2015







Problems







 Problem Type  Condition  ICD-9 Code  Onset Dates  Condition Status

 

 Problem  Cigarette nicotine dependence,  F17.210    Active



   uncomplicated      

 

 Problem  Other long term (current) drug  Z79.899    Active



   therapy      

 

 Problem  Rheumatoid arthritis of multiple  M06.09    Active



   sites without rheumatoid factor      







Social History







 Social History Element  Qualifiers  Date Reported

 

 Illicit Drugs  .  none  2016

 

 Alcohol Screening:  .   Points: 0  2016

 

 Tobacco Use:  .   Are you a:: current smoker , How often do  2016



   you smoke cigarettes?: every day, How many  



   cigarettes a day do you smoke?: 11-20, How soon  



   after you wake up do you smoke your first  



   cigarette?: 6-30 min, Are you interested in  



   quitting?: Not ready to quit  

 

 Caffeine:  yes.  1-5  2016

 

 Exercise:  yes.  walk  2016

 

 Alcohol:  no.  2016







Summary Purpose

[employer] Submission

## 2018-12-17 NOTE — XMS REPORT
Continuity of Care Document

 Created on:2019



Patient:MARINO ALMODOVAR

Sex:Male

:1955

External Reference #:[id number]





Demographics







 Address  [address] [place] [id number]

 

 Phone  [phone]

 

 Phone  [phone]

 

 Preferred Language  Unknown

 

 Marital Status  Unknown

 

 Alevism Affiliation  Unknown

 

 Race  Unknown

 

 Ethnic Group  Unknown









Author







 Organization  [facility]









Problems







 Problem  Status  Onset  Classification  Date  Comments  Source



     Date    Reported    



             



             

 

 Pain of right  Active    Problem  2018    Ruel



 hand            [facility]



             



             

 

 Pain of left hand  Active    Problem  2018    Ruel



             [facility]



             



             

 

 Osteopenia  Active    Problem  2018    Ruel



             [facility]



             



             

 

 Other long term  Active    Diagnosis  2018    Ruel



 drug therapy            [facility]



             



             

 

 Rheumatoid  Active    Diagnosis  2018    [facility]



 arthritis of            [facility]



 multiple sites            



 without            



 rheumatoid factor            



             



             

 

 Cigarette  Active    Problem  2018    Ruel



 nicotine            [facility]



 dependence,            



 uncomplicated            



             



             

 

 Other chronic  Active    Diagnosis  2018    Ruel



 pain            [facility]



             



             

 

 Chronic  Active    Diagnosis  2017    Ruel



 prescription            [facility]



 opiate use            



             

 

 Long-term use of  Active    Problem  2016    Ruel



 other medications            [facility]



 - High Risk            



             

 

 Long-term use of  Active    Problem  2016    Ruel



 steroids            [facility]



             



             

 

 Rheumatoid  Active    Problem  2015    Ruel



 arthritis            [facility]



             



             

 

 Hypertension  Active    Problem  2016    Ruel



             [facility]



             



             

 

 Osteoarthrosis,  Active    Problem  2016    Ruel



 multiple sites            [facility]



             



             

 

 Lumbago  Active    Problem  2016    Ruel



             [facility]



             



             

 

 Rheumatoid  Active    Problem  2016    Ruel



 arthritis of            [facility]



 multiple sites            



 without organ or            



 system            



 involvement with            



 positive            



 rheumatoid factor            



             



             

 

 Need for  Active    Diagnosis  01/10/2015    Ruel



 prophylactic            [facility]



 vaccination and            



 inoculation,            



 Influenza            



             

 

 Encounter for  Active    Diagnosis  2016    Reul



 long-term use of            [facility]



 other high-risk            



 medications            



             







Medications







 Medication  Details  Route  Status  Patient  Ordering  Order  Source



         Instructions  Provider  Date  



               



               



               

 

 Hydrocodone-Ace  1 tab  Orally  Active   MG  Chin  /  Ruel



 taminophen        Orally BID to      [facility]



         QID as needed      



               



               

 

 Xeljanz XR  1 tablet  Orally  Active  11 MG Orally  Chin  /  Re[facility]



         Once a day    2018  [facility]



               



               



               

 

 Hydrocodone-Ace  1 tab  Orally  Active   MG  Chin  /  R[facility]



 taminophen        Orally BID to      [facility]



         QID as needed      



               



               

 

 Hydrocodone-Ace  1 tab  Orally  Active   MG  Fakoya  /  R[facility]



 taminophen        Orally twice a    2017  [facility]



         day as needed      



               



               

 

 Medrol Dose Norris  as  Orally  Active  4mg Orally once  Chin  /  Ruel



   directed      a day      [facility]



               



               



               

 

 Folic Acid  1 tablet  Orally  Active  1 MG Orally  Chin  /  Ruel



   once a day      Once a day      [facility]



   orally 90            



   days            



               



               

 

 Hydrocodone-Ace  1 tablet  Orally  Active   MG  Chin  11/10/  [facility]



 taminophen  as needed      Orally every 6      [facility]



         hrs      



               



               

 

 Xeljanz XR  1 tablet  Orally  Active  11 MG Orally  Chin  10/27/  Ruel



         Once a day    2016  [facility]



               



               



               

 

 Folic Acid  1 tablet  Orally  Active  1 MG Orally  Chin  10/24/  Ruel



   once a day      Once a day      [facility]



   orally 90            



   days            



               



               

 

 Xeljanz XR  1 tablet  Orally  Active  11 MG Orally  Chin  10/11/  Ruel



         Once a day      [facility]



               



               



               

 

 Actemra SQ  1  subcutaneous  No  162mg/0.9 mL  [facility]  /  [facility]



   injection    Longer  subcutaneous      [facility]



       Active  once a week      



               



               



               

 

 Folic Acid  1 tablet  Orally  Active  1 MG Orally  [facility]  /  [facility]



         Once a day      [facility]



               



               



               

 

 Hydrocodone-Ace  1 tablet  Orally  Active   MG  Chin  /  [facility]



 taminophen  as needed      Orally every 6      [facility]



         hrs      



               



               

 

 Folic Acid  take 1  Orally  Active  1 Orally Once a  [facility]  /  [facility]



   tablet by      day      [facility]



   mouth once            



   daily            



               



               

 

 Voltaren  as  Transdermal  Active  1 % Transdermal  Chin  /  [facility]



   directed      PRN      [facility]



               



               



               

 

 Actemra SQ  1  subcutaneous  Active  162mg/0.9 mL  [facility]  /  [facility]



   injection      subcutaneous    2016  [facility]



         once a week      



               



               

 

 ORENCIA SQ  125mg  SUBCUTANEOUSLY  No  125MG  Chin  /  [facility]



       Longer  SUBCUTANEOUSLY      [facility]



       Active  ONCE A WEEK      



               



               



               

 

 Hydrocodone-Ace  1 tablet  Orally  Active   MG  Chin  /  [facility]



 taminophen  as needed      Orally every 6      [facility]



         hrs      



               



               

 

 ORENCIA SQ  125mg  SUBCUTANEOUSLY  Active  125MG  [facility]  /  [facility]



         SUBCUTANEOUSLY      [facility]



         ONCE A WEEK      



               



               

 

 ORENCIA SQ  125mg  SUBCUTANEOUSLY  Active  125MG  [facility]  /  [facility]



         SUBCUTANEOUSLY      [facility]



         ONCE A WEEK      



               



               

 

 Hydrocodone-Ace  1 tablet  Orally  Active   MG  [facility]  /  [facility]



 taminophen  as needed      Orally every 6      [facility]



         hrs      



               



               

 

 Hydrocodone-Ace  1 tablet  Orally  Active   MG  [facility]  /  [facility]



 taminophen  as needed      Orally every 6      [facility]



         hrs      



               



               

 

 Folic Acid  1 tablet  Orally  Active  1 MG Orally  Chin  /  [facility]



         Once a day      [facility]



               



               



               

 

 Voltaren  as  Transdermal  Active  1 % Transdermal  Chin  /  [facility]



   directed      Three times a    2014



         day      



               



               

 

 ORENCIA SQ  125MG  SUBCUTANEOUSLY  Active  125MG  Chin  /  [facility]



         SUBCUTANEOUSLY      [facility]



         ONCE A WEEK      



               



               

 

 Hydrocodone-Ace  1 tablet  Orally  Active   MG  Chin  /  [facility]



 taminophen  as needed      Orally every 6      [facility]



         hrs      



               



               

 

 PredniSONE  1 tablet  Orally  Active  5 MG Orally  Chin  /  [facility]



         Once a day      [facility]



               



               



               

 

 Voltaren  as  Transdermal  Active  1 % Transdermal  Chin    [facility]



   directed      PRN      [facility]



               



               

 

 Nuvigil  1 tablet  Orally  Active  250 MG Orally  Cihn    [facility]



   in the      Once a day      [facility]



   morning            



               



               

 

 Inderal LA  1 capsule  Orally  Active  160 MG Orally  Chin    [facility]



         Once a day      [facility]



               



               

 

 Calcium  1 tablet  Orally  Active  otc Orally once  Chin    [facility]



   with meals      a day      [facility]



               



               



               

 

 Folic Acid  1 tablet  Orally  Active  1 MG Orally  Chin    [facility]



         Once a day      [facility]



               



               

 

 Propranolol HCl  1 tablet  Orally  Active  10 MG Orally  Chin    [facility]



         Once a day      [facility]



               



               

 

 Multivitamins  1 tablet  Orally  Active  Orally once a  Chin    R[facility]



         day      [facility]



               



               

 

 Tricor  1 tablet  Orally  Active  145 MG Orally  Chin    [facility]



         Once a day      [facility]



               



               

 

 Trazodone HCl  1 tablet  Orally  Active  150 MG Orally  Chin    [facility]



         at night      [facility]



               



               

 

 Hydrocodone-Ace  1 tablet  Orally  Active   MG  Chin    [facility]



 taminophen  as needed      Orally every 6      [facility]



         hrs      



               

 

 Xeljanz XR  1 tablet  Orally  Active  11 MG Orally  Chin    [facility]



         Once a day      [facility]



               



               

 

 Xeljanz XR  TAKE 1  NA  Active  11 MG  Chin    [facility]



   TABLET BY            [facility]



   MOUTH            



   EVERY DAY            



               



               

 

 Inderal LA  1 capsule  Orally  Active  160 MG Orally  Chin    [facility]



         Once a day      [facility]



               



               

 

 Propranolol HCl  1 tablet  Orally  Active  10 MG Orally  Chin    [facility]



         Once a day      [facility]



               



               

 

 Multivitamins  1 tablet  Orally  Active  Orally once a  Chin    [facility]



         day      [facility]



               



               

 

 Voltaren  as  Transdermal  Active  1 % Transdermal  Chin    [facility]



   directed      PRN      [facility]



               



               

 

 Tricor  1 tablet  Orally  Active  145 MG Orally  Chin    [facility]



         Once a day      [facility]



               



               

 

 Trazodone HCl  1 tablet  Orally  Active  150 MG Orally  Chin    [facility]



         at night      [facility]



               



               

 

 Nuvigil  1 tablet  Orally  Active  250 MG Orally  Chin    [facility]



   in the      Once a day      [facility]



   morning            



               



               

 

 Calcium  1 tablet  Orally  Active  otc Orally once  Chin    [facility]



   with meals      a day      [facility]



               



               



               

 

 Hydrocodone-Ace  1 tab  Orally  Active   MG  Chin    [facility]



 taminophen        Orally BID to      [facility]



         QID as needed      



               



               

 

 Folic Acid  TAKE 1 BY  NA  Active  1 MG  Chin    [facility]



   MOUTH            [facility]



   DAILY            



               



               

 

 Nexium  1 capsule  Orally  Active  40 MG Orally  Chin    [facility]



         prn      [facility]



               



               

 

 Methotrexate  1 ml  Injection  Active  25 MG/ML  Chin    [facility]



 Sodium        Injection Once      [facility]



         a week      



               

 

 ORENCIA SQ  125mg  SUBCUTANEOUSLY  Active  125MG  Chin    [facility]



         SUBCUTANEOUSLY      [facility]



         ONCE A WEEK      



               



               

 

 Methotrexate  1 ml  SQ injection  Active  25 MG/ML SQ  Chin    Rule



 Sodium        injection Once      [facility]



         a week      



               

 

 Xeljanz XR  TAKE 1  NA  Active  11 MG  Chin    [facility]



   TABLET BY            [facility]



   MOUTH            



   EVERY DAY            



               



               

 

 Aricept  1 tablet  Orally  Active  23 MG Orally  Chin    [facility]



   at bedtime      Once a day      [facility]



               



               



               

 

 PredniSONE  1 tablet  Orally  Active  5 MG Orally  Chin    [facility]



         Once a day      [facility]



               



               

 

 Voltaren  as  Transdermal  Active  1 % Transdermal  Chin    [facility]



   directed      PRN      [facility]



               



               

 

 Hydrocodone-Ace  1 tablet  Orally  Active   MG  Chin    [facility]



 taminophen  as needed      Orally every 6      [facility]



         hrs      



               







Allergies, Adverse Reactions, Alerts







 Substance  Category  Reaction  Severity  Reaction  Status  Date  Comments  
Source



         type    Reported    



                 



                 



                 

 

 Lyrica  Adverse  Info Not    Adverse  Active      [facility]



   Reaction  Available    Reaction    8    [facility]



                 



                 

 

 Orencia  Adverse  Info Not    Adverse  Active      [facility]



   Reaction  Available    Reaction    8    [facility]



                 



                 

 

 Humira  Adverse  Info Not    Adverse  Active      [facility]



   Reaction  Available    Reaction    8    [facility]



                 



                 

 

 Enbrel  Adverse  Info Not    Adverse  Active      [facility]



   Reaction  Available    Reaction    8    [facility]



                 



                 







Immunizations







 Immunization  Date Given  Site  Status  Last Updated  Comments  Source



             



             



             

 

 Actemra  2016    completed      Ruel [facility]



             



             

 

 Depomedrol  2015    completed      R[facility]



             



             

 

 Flu Vaccine  10/15/2014    completed      R[facility]



             



             

 

 Depomedrol  2014    completed      R[facility]



             



             

 

 Toradol  2014    completed      Ruel [facility]



             



             







Results







 Order  Results  Value  Reference  Date  Interpretation  Comments  Source



 Name      Range        



               



               







Vital Signs







 Vital Sign  Value  Date  Comments  Source



         

 

 Weight  217.5  2018    Ruel [facility]



         



         

 

 Height  73  2018    Rule [facility]



         



         

 

 Temperature Oral (F)  97.0 F  2018    Ruel [facility]



         



         

 

 Heart Rate  68  2018    Ruel [facility]



         



         

 

 Diastolic (mm Hg)  98  2018    Ruel [facility]



         



         

 

 Systolic (mm Hg)  158  2018    Ruel [facility]



         



         

 

 Weight  218.8  2018    Ruel [facility]



         



         

 

 Height  73  2018    Ruel [facility]



         



         

 

 Temperature Oral (F)  97.6 F  2018    Ruel [facility]



         



         

 

 Heart Rate  80  2018    Ruel [facility]



         



         

 

 Diastolic (mm Hg)  88  2018    Ruel [facility]



         



         

 

 Systolic (mm Hg)  140  2018    Ruel [facility]



         



         

 

 Weight  222.1  2018    Ruel [facility]



         



         

 

 Height  73  2018    Ruel [facility]



         



         

 

 Temperature Oral (F)  96.7 F  2018    Ruel [facility]



         



         

 

 Heart Rate  78  2018    Ruel [facility]



         



         

 

 Diastolic (mm Hg)  98  2018    Ruel [facility]



         



         

 

 Systolic (mm Hg)  144  2018    Ruel [facility]



         



         

 

 Weight  215.1  2018    Ruel [facility]



         



         

 

 Height  73  2018    Ruel [facility]



         



         

 

 Temperature Oral (F)  95.9 F  2018    Ruel [facility]



         



         

 

 Heart Rate  68  2018    Ruel [facility]



         



         

 

 Diastolic (mm Hg)  98  2018    Ruel [facility]



         



         

 

 Systolic (mm Hg)  140  2018    Ruel [facility]



         



         

 

 Weight  212.5  2017    Ruel [facility]



         



         

 

 Height  73  2017    Urel [facility]



         



         

 

 Temperature Oral (F)  96.6 F  2017    Ruel [facility]



         



         

 

 Heart Rate  64  2017    Ruel [facility]



         



         

 

 Diastolic (mm Hg)  88  2017    Ruel [facility]



         



         

 

 Systolic (mm Hg)  124  2017    Ruel [facility]



         



         

 

 Weight  208  2017    Ruel [facility]



         



         

 

 Height  72  2017    Ruel [facility]



         



         

 

 Temperature Oral (F)  96.3 F  2017    Ruel [facility]



         



         

 

 Heart Rate  68  2017    Ruel [facility]



         



         

 

 Diastolic (mm Hg)  84  2017    Ruel [facility]



         



         

 

 Systolic (mm Hg)  142  2017    Ruel [facility]



         



         

 

 Weight  209.3  2017    Ruel [facility]



         



         

 

 Height  73.5  2017    Ruel [facility]



         



         

 

 Temperature Oral (F)  97.5 F  2017    Ruel [facility]



         



         

 

 Heart Rate  72  2017    Ruel [facility]



         



         

 

 Diastolic (mm Hg)  76  2017    Ruel [facility]



         



         

 

 Systolic (mm Hg)  142  2017    Ruel [facility]



         



         

 

 Weight  209  2017    Ruel [facility]



         



         

 

 Height  72.5  2017    Ruel [facility]



         



         

 

 Temperature Oral (F)  97.4 F  2017    Ruel [facility]



         



         

 

 Heart Rate  72  2017    Ruel [facility]



         



         

 

 Diastolic (mm Hg)  90  2017    Ruel [facility]



         



         

 

 Systolic (mm Hg)  140  2017    Ruel [facility]



         



         

 

 Weight  204.3  2017    Ruel [facility]



         



         

 

 Height  73  2017    Ruel [facility]



         



         

 

 Temperature Oral (F)  97.4 F  2017    Ruel [facility]o



         



         

 

 Heart Rate  64  2017    Ruel [facility]



         



         

 

 Diastolic (mm Hg)  90  2017    Ruel [facility]



         



         

 

 Systolic (mm Hg)  142  2017    Ruel [facility]



         



         

 

 Weight  201  10/11/2016    Ruel [facility]



         



         

 

 Height  73  10/11/2016    Ruel [facility]



         



         

 

 Temperature Oral (F)  97.0 F  10/11/2016    Ruel [facility]



         



         

 

 Heart Rate  68  10/11/2016    Ruel [facility]



         



         

 

 Diastolic (mm Hg)  80  10/11/2016    Ruel [facility]



         



         

 

 Systolic (mm Hg)  128  10/11/2016    Ruel [facility]



         



         

 

 Weight  203  2016    Ruel [facility]



         



         

 

 Height  73  2016    Ruel [facility]



         



         

 

 Temperature Oral (F)  97.0 F  2016    Ruel [facility]



         



         

 

 Heart Rate  72  2016    Ruel [facility]



         



         

 

 Weight  206  2016    Ruel [facility]



         



         

 

 Height  73  2016    Ruel [facility]



         



         

 

 Temperature Oral (F)  97.0 F  2016    Ruel [facility]



         



         

 

 Heart Rate  70  2016    Ruel [facility]



         



         

 

 Diastolic (mm Hg)  96  2016    Ruel [facility]



         



         

 

 Systolic (mm Hg)  150  2016    Ruel [facility]o



         



         

 

 Weight  206  2016    Ruel [facility]



         



         

 

 Height  72  2016    Ruel [facility]



         



         

 

 Temperature Oral (F)  97.5 F  2016    Ruel Rboledo



         



         

 

 Heart Rate  64  2016    Ruel [facility]o



         



         

 

 Diastolic (mm Hg)  90  2016    Ruel [facility]o



         



         

 

 Systolic (mm Hg)  130  2016    Ruel [facility]o



         



         

 

 Weight  202  2015    Ruel [facility]



         



         

 

 Height  72  2015    Ruel [facility]



         



         

 

 Temperature Oral (F)  96.7 F  2015    Ruel [facility]



         



         

 

 Heart Rate  64  2015    Ruel [facility]



         



         

 

 Diastolic (mm Hg)  90  2015    Ruel [facility]



         



         

 

 Systolic (mm Hg)  130  2015    Ruel [facility]



         



         

 

 Weight  200  2015    Ruel [facility]



         



         

 

 Height  74  2015    Ruel [facility]



         



         

 

 Temperature Oral (F)  97.4 F  2015    Ruel [facility]



         



         

 

 Heart Rate  72  2015    Ruel [facility]



         



         

 

 Diastolic (mm Hg)  74  2015    Ruel [facility]



         



         

 

 Systolic (mm Hg)  126  2015    Ruel [facility]



         



         

 

 Weight  204  2015    Ruel [facility]



         



         

 

 Height  72  2015    Ruel [facility]



         



         

 

 Temperature Oral (F)  97.6 F  2015    Ruel [facility]



         



         

 

 Heart Rate  72  2015    Ruel [facility]



         



         

 

 Diastolic (mm Hg)  70  2015    Ruel [facility]



         



         

 

 Systolic (mm Hg)  136  2015    Ruel [facility]



         



         

 

 Weight  203  10/15/2014    Ruel [facility]



         



         

 

 Height  72  10/15/2014    Ruel [facility]



         



         

 

 Temperature Oral (F)  97.0 F  10/15/2014    Ruel [facility]



         



         

 

 Heart Rate  76  10/15/2014    Ruel [facility]



         



         

 

 Diastolic (mm Hg)  84  10/15/2014    Ruel [facility]o



         



         

 

 Systolic (mm Hg)  146  10/15/2014    Ruel [facility]o



         



         

 

 Weight  204  2014    Ruel [facility]o



         



         

 

 Height  72  2014    Ruel [facility]



         



         

 

 Temperature Oral (F)  97.0 F  2014    Ruel [facility]



         



         

 

 Heart Rate  80  2014    Ruel [facility]



         



         

 

 Diastolic (mm Hg)  88  2014    Ruel [facility]



         



         

 

 Systolic (mm Hg)  140  2014    Rule [facility]



         



         

 

 Weight  210  2014    Ruel [facility]



         



         

 

 Height  72  2014    Ruel [facility]



         



         

 

 Temperature Oral (F)  96.9 F  2014    Ruel [facility]



         



         

 

 Heart Rate  80  2014    Ruel [facility]



         



         

 

 Diastolic (mm Hg)  80  2014    Ruel [facility]



         



         

 

 Systolic (mm Hg)  132  2014    Ruel [facility]



         



         







Encounters







 Location  Location  Encounter  Encounter  Reason  Attending  ADM  DC  Status  
Source



   Details  Type  Number  For  Provider  Date  Date    



         Visit          



                   



                   



                   

 

 Mode COLMENARES.    Unknown  [id number]          Ruel Robledo MD      [id number]      /2014    [facility]



       [id number]            



       [id number]            



                   



                   

 

 Mode A.    Unknown  [id number]          Ruel Robledo MD      [id number]      /2014    [facility]



       [id number]            



       [id number]            



                   



                   

 

 Mode A.    Unknown  [id number]          Ruel Robledo MD      [id number]      /2014    [facility]



       [id number]            



       [id number]            



                   



                   

 

 Mode A.    Unknown  [id number]          Ruel Robledo MD      [id number]      /2014    Venkat



       [id number]            



       [id number]            



                   



                   

 

 Mode A.    Unknown  [id number]          Ruel Robledo MD      [id number]      /2014    Ven[facility]



       [id number]            



       [id number]            



                   



                   

 

 Mode A.    Unknown  [id number]          Ruel Robledo MD      [id number]      /2014    Venkat



       [id number]            



       [id number]            



                   



                   

 

 Mode A.    Unknown  [id number]          Ruel Robledo MD      [id number]      /2014    Venkat



       [id number]            



       [id number]            



                   



                   

 

 Mode A.    Unknown  [id number]          Ruel Robledo MD      [id number]      /2014    Venkat



       [id number]            



       [id number]            



                   



                   

 

 Mode A.    Unknown  [id number]          Ruel Robledo MD      [id number]      /2014    Venkat



       [id number]            



       [id number]            



                   



                   

 

 Mode A.    Unknown  [id number]          Ruel Robledo MD      [id number]      /2014    Venkat



       [id number]            



       [id number]            



                   



                   

 

 Mode A.    Unknown  [id number]          Ruel Robledo MD      520-7737-8      /2014    Venkat



       952-s4b076            



       a367a3            



                   



                   

 

 Mode A.    Unknown  ay800047-4          Ruel Robledo MD      6e2-144u-6      /2014    Venkat



       176-97b37f            



       70906z            



                   



                   

 

 Mode A.    Unknown  1l59v676-0          Ruel Robledo MD      4u8-9l78-1      /2014    Venkat



       2cf-4fb7f6            



       76i876            



                   



                   

 

 Mode A.    Unknown  gb79c2kn-7          Ruel Robledo MD      85c-4495-a      /2014    Venkat



       021-5f9d60            



       [id number]            



                   



                   

 

 Mode A.    Unknown  3iwql1ae-x          Ruel Robledo MD      253-4e1c-      /2014    Venkat



       613-8ty722            



       np932j            



                   



                   

 

 Mode A.    Unknown  i7xp0l33-t          Ruel Robledo MD      837-4ef4-a      /2014    Venkat



       q07-rv7889            



       3883db            



                   



                   

 

 Mode A.    Unknown  02h12725-r          Ruel Robledo MD      716-435e-    Venkat



       1ee-aa6dcf            



       b8aae2            



                   



                   

 

 Mode A.    Unknown  5u66o2n5-4          Ruel Robledo MD      5db-4dd2-    Venkat



       5f5-773559            



       h57035            



                   



                   

 

 Mode A.    Unknown  38emj8q9-0          Ruel Robledo MD      48c-42e8-    Venkat



       q0b-8xi30g            



       b926a4            



                   



                   

 

 Mode A.    Unknown  r3in257k-r          Ruel Robledo MD      95e-4818-a      /2014    Venkat



       n58-p61b71            



       l4508b            



                   



                   

 

 Mode A.    Unknown  973r672m-i          Ruel Robledo MD      5d8-3am0-b      /2014    Venkat



       d73-v1rsp3            



       af76c8            



                   



                   

 

 Mode A.    Unknown  588446q7-1          Ruel Robledo MD      905-4c87-8      /2014    Venkat



       783-zu3682            



       7d7f8b            



                   



                   

 

 Mode A.    Unknown  n66l1546-2          Ruel Robledo MD      574-4be8-8      /2014    Venkat



       4be-b512a6            



       405115            



                   



                   

 

 Mode A.    Unknown  3lr9300j-o          Ruel Robledo MD      fd6-4d6c-a      /2014    Venkat



       4bd-32aa08            



       0yk756            



                   



                   

 

 Mode A.    Unknown  h39gt51a-5          Ruel Robledo MD      cda-4b35-a      /2014    Venkat



       bfb-a2dc2e            



       i78859            



                   



                   

 

 Mode A.    Unknown  90x7a63m-7          Ruel Robledo MD      c3c-08c7-a      /2014    Venkat



       875-d47a42            



       5861dc            



                   



                   

 

 Mode A.    Unknown  g98982dw-q          Ruel Robledo MD      cd2-4443-a      /2014    Venkat



       s90-925btk            



       f9be01            



                   



                   

 

 Mode A.    Unknown  sz304201-5          Ruel Robledo MD      fd9-4766-a      /2014    Venkat



       r8o-z0k580            



       1ff8ed            



                   



                   

 

 Mode A.    Unknown  6u9o750b-4          Ruel Robledo MD      5ca-4dcc-9      /2014    Venkat



       c48-i25x46            



       ec2f47            



                   



                   

 

 Mode A.    Unknown  l70l495u-x          Ruel Robledo MD      cdb-46f7-8      /2014    Venkat



       t1k-13ve45            



       3babb2            



                   



                   

 

 Mode A.    Refill-  1r8197r4-k          MD Sherine Michael  x38-5983-9      /2014    Venkat



       4t6-5l4996            



       a44a17            



                   



                   

 

 Mode A.    Refill-  40l59m0g-8          MD Sherine Michael  l7f-6l2a-k      /2014    Venkat



       585-68e2e4            



       0h6256            



                   



                   

 

 Mode COLMENARESJaimie    Refill-  6k0907xe-j          MD Sherine Michael  t04-7704-n      /2014    Robledo



       1q8-6w8932            



       5134b6            



                   



                   

 

 Mode COLMENARESJaimie    Refill-  vqb58434-8          MD Sherine Michael  n58-997x-c      /2014    Robledo



       941-fb3e02            



       08529x            



                   



                   

 

 Mode AJaimie    Refill-  4zlqj559-l          MD Sherine Michael  1cd-44dc-8      /2014    Venkat



       cf5-22ae2e            



       1f47d5            



                   



                   

 

 Mode FRANCISCOJaimie    Refill-  9621x855-4          MD Sherine Michael  9u6-0562-6      /2014    Robledo



       9r5-macw6j            



       8c4b97            



                   



                   

 

 Mode AJaimie    Refill-  ir5260x3-d          MD Sherine Michael  o49-47u2-m      /2014    Robledo



       x8j-6i5b35            



       837ce6            



                   



                   

 

 Mode FRANCISCOJaimie    Refill-  jy231abx-y          MD Sherine Michael  o0a-294z-l      /2014    Robledo



       99e-48ce53            



       q2e753            



                   



                   

 

 Mode FRANCISCOJaimie    Refill-  705024u5-l          MD Sherine Michael  59d-4783-b      /2014    Venkat



       15d-4f3ca6            



       7125e9            



                   



                   

 

 Mode FRANCISCOJaimie    Refill-  y457f377-e          MD Sherine Michael  a80-6x2n-u      /2014    Robledo



       0ed-232e7c            



       224e09            



                   



                   

 

 Mode ANDRADE    Refill-  dm35e36c-8          MD Sherine Michael  ac1-405e-9      /2014    Venkat



       99c-9b7feb            



       mc2284            



                   



                   

 

 Mode ANDRADE    Refill-  k47504yr-4          MD Sherine Michael  3m6-6cl6-3      /2014    Venkat



       7x3-49w706            



       45554d            



                   



                   

 

 Mode AJaimie    Refill-  w9166jh9-0          MD Sherine Michael  361-4ed4-      /2014    Venkat



       481-5848b3            



       ov4441            



                   



                   

 

 Mode COLMENARESJaimie    Refill-  22606vq7-m          MD Sherine Michael  545-4d9f-9      /2014    Venkat



       ebe-6219c7            



       b55fef            



                   



                   

 

 Mode ANDRADE    Refill-  929550v4-1          MD Sherine Michael  6a7-34vb-k      /2014    Venkat



       a68-13ec5b            



       87bf3b            



                   



                   

 

 Mode FRANCISCOJaimie    Refill-  0jf1bx99-0          MD Sherine Michael  u99-9bu5-0      /2014    Venkat



       839-d7ac4f            



       ae32df            



                   



                   

 

 Mode AJaimie    Refill-  p61h3650-7          MD Sherine Michael  6ff-460f-a      /2014    Venkat



       21d-890e8d            



       db9e5d            



                   



                   

 

 Mode FRANCISCOJaimie    Refill-  5d8116r4-6          MD Sherine Michael  5de-40e6-      /2014    Venkat



       06c-c75b3d            



       f463c9            



                   



                   

 

 Mode FRANCISCOJaimie    Refill-  8ccq7681-9          MD Sherine Michael  4ef-4066-a      /2014    Venkat



       7o7-5m17h6            



       0c4bf1            



                   



                   

 

 Mode ANDRADE    Refill-  7o9f78y8-i          MD Sherine Michael  f48-1m12-6      /2014    Venkat



       558-a4c0b5            



       93440f            



                   



                   

 

 Mode ANDRADE    Refill-  m183q75p-0          MD Sherine Michael  i32-2j65-6      /2014    Venkat



       294-8d1dbe            



       c9ea71            



                   



                   

 

 Mode ANDRADE    Refill-  7e6l668e-0          MD Sherine Michael  42f-4024-b      /2014    Venkat



       373-74b61f            



       f78b0c            



                   



                   

 

 Mode ANDRADE    Refill-  949u93bv-9          MD Sherine Michael  i72-4241-0      /2014    Venkat



       19c-33988b            



       u30263            



                   



                   

 

 Mode ANDRADE    Refill-  7b334364-s          MD Sherine Michael  e44-4378-6      /2014    Venkat



       77a-8c0af2            



       931504            



                   



                   

 

 Mode ANDRADE    Refill-  5ky3941u-p          MD Sherine Michael  b59-5777-n      /2014    Venkat



       be6-2sz440            



       7ad1c4            



                   



                   

 

 Mode COLMENARESJaimie    Refill-  27l7p56k-8          MD Sherine Michael  08f-47de-8      /2014    Venkat



       m51-38u76r            



       34h747            



                   



                   

 

 Mode ANDRADE    Refill-  5o918e4d-6          MD Sherine Michael  17c-4ad0-    Venkat



       817-0df98e            



       1b25ea            



                   



                   

 

 Mode ANDRADE    Refill-  0pwv5c14-s          MD Sherine Michael  a8r-88p3-c      /2014    Venkat



       5a7-286hb4            



       750991            



                   



                   

 

 Mode AJaimie    Refill-  q1x52z5c-6          MD Sherine Michael  164-4cfa-      /2014    Venkat



       m8o-z97w1s            



       b8o412            



                   



                   

 

 Mode AJaimie    Refill-  664a8x16-t          MD Sherine Michael  af6-461a-    Venkat



       87a-5c171n            



       36m802            



                   



                   

 

 Mode FRANCISCOJaimie    Refill-Lynni  72cc02de-3          MD francisco Michael  ef6-4f89-    Venkat



       j7d-h17636            



       fd5d7a            



                   



                   

 

 Mode COLMENARES.    Refill-Orenci  83wu012d-3          MD francisco Michael  3w8-29c5-w      /2014    Venkat



       289-4da19f            



       0aeaf0            



                   



                   

 

 Mode A.    Refill-Orenci  7f7s4957-2          MD francisco Michael  x9f-118a-h      /2014    Venkat



       0g4-438yl3            



       49x762            



                   



                   

 

 Mode A.    Refill-Orenci  9502b0z6-4          MD francisco Michael  745-444d-9      /2014    Venkat



       176-a0dfa9            



       92e5dc            



                   



                   

 

 Mode A.    Refill-Orenci  eacdddc5-9          MD francisco Michael  86b-4583-a      /2014    Venkat



       2bb-a4eaf6            



       xcf378            



                   



                   

 

 Mode A.    Refill-Orenci  vt44n918-6          MD francisco Michael  77c-4134-a      /2014    Venkat



       ed2-ff3b55            



       136f2d            



                   



                   

 

 Mode A.    Refill-Orenci  9ofm6eoy-8          MD francisco Michael  254-4857-9      /2014    Venkat



       3b1-0v5267            



       a4ac61            



                   



                   

 

 Mode A.    Refill-Orenci  00l691p9-6          MD francisco Michael  u8b-70uf-r      /2014    Venkat



       084-e5eb38            



       40y286            



                   



                   

 

 Mode A.    Refill-Orenci  f73a3f0e-3          MD francisco Michael  3r1-2640-2      /2014    Venkat



       7d8-j2c213            



       4sl914            



                   



                   

 

 Mode A.    Refill-Orenci  7z8n4974-5          MD francisco Michael  h8h-7708-o      /2014    Venkat



       353-ae73a3            



       78e49a            



                   



                   

 

 Mode A.    Refill-Orenci  60yj0o71-4          MD francisco Michael  223-44e7-9      /2014    Robledo



       f07-1n5o9i            



       166657            



                   



                   

 

 Mode A.    Refill-Orenci  3eoc44k9-3          MD francisco Michael  612-4de7-9      /2014    Venkat



       c55-37p729            



       cbecb3            



                   



                   

 

 Mode A.    Refill-Orenci  80kr8450-5          MD francisco Michael  m3f-4177-w      /2014    Venkat



       42e-d3c38c            



       8la950            



                   



                   

 

 Mode A.    Refill-Orenci  n612033m-2          MD francisco Michael  795-466d-9      /2014    Venkat



       773-450a4f            



       167030            



                   



                   

 

 Mode A.    Refill-Orenci  zi337728-s          MD francisco Michael  06a-4ec4-b      /2014    Venkat



       845-b2dddc            



       57e7a4            



                   



                   

 

 Mode A.    Refill-Orenci  u40qx593-3          MD francisco Michael  u9v-36t3-7      /2014    Venkat



       82c-9v5675            



       011802            



                   



                   

 

 Mode A.    Refill-Orenci  i7l69jd0-0          MD francisco Michael  u7k-9j3b-1      /2014    Venkat



       yoli-51625u            



       hw284j            



                   



                   

 

 Mode A.    Refill-Orenci  3h75658x-7          MD francisco Michael  727-491d-a      /2014    Venkat



       fba-v26478            



       05632v            



                   



                   

 

 Mode A.    Refill-Orenci  om664li4-6          MD francisco Michael  197-42ca-b      /2014    Venkat



       bfc-b1eaea            



       59eda9            



                   



                   

 

 Mode A.    Refill-Orenci  97475524-7          MD francisco Michael  m79-923d-6      /2014    Venkat



       add-d122dc            



       7adf69            



                   



                   

 

 Mode COLMENARES.    Refill-Orenci  x51tka96-0          MD francisco Michael  486-4df0-a      /2014    Venkat



       f21-2c3382            



       8101dd            



                   



                   

 

 Mode COLMENARES.    Refill-Orenci  mynv1gh0-a          MD francisco Michael  ae1-41ac-9      /2014    Venkat



       11e-2525d5            



       685943            



                   



                   

 

 Mode A.    Refill-Orenci  0525d804-8          MD francisco Michael  957-44c5-b      /2014    Venkat



       1bb-51aae8            



       6e275z            



                   



                   

 

 Mode COLMENARES.    Refill-Orenci  595429aa-3          MD francisco Michael  c56-60q3-f      /2014    Venkat



       7y8-32948n            



       f80c21            



                   



                   

 

 Mode A.    Refill-Orenci  uo4a2186-6          MD francisco Michael  o8w-02a2-3      /2014    Venkat



       q4e-z2j42y            



       2be4e4            



                   



                   

 

 Mode A.    Refill-Orenci  b227q37w-g          MD francisco Michael  eba-4ffb-8      /2014    Venkat



       873-g6y782            



       a8ff7e            



                   



                   

 

 Mode A.    Refill-Orenci  s6u75219-7          MD francisco Michael  h1c-3z6s-0      /2014    Venkat



       s0t-rzf92f            



       317b36            



                   



                   

 

 Mode A.    Refill-Orenci  s94e0c4t-1          MD francisco Michael  aa7-4196-8      /2014    Venkat



       568-y1k333            



       c976f5            



                   



                   

 

 Mode A.    Unknown  031u9w1n-t          Ruel Robledo MD      9s6-7fc0-p      /2014    Venkat



       f0a-v957d6            



       543722            



                   



                   

 

 Mode A.    Unknown  5t3e8744-3          Ruel Robledo MD      676-49ef-8      /2014    Venkat



       5m6-b0o00l            



       3a118o            



                   



                   

 

 Mode A.    Unknown  85731820-9          Ruel Robledo MD      ca4-42cc-b      /2014    Venkat



       69e-n4931y            



       47917q            



                   



                   

 

 Mode A.    Unknown  610m585u-p          Ruel Robledo MD      7ac-4a75-9      /2014    Venkat



       819-4eef90            



       802698            



                   



                   

 

 Mode A.    Unknown  7ih994d9-n          Ruel Robledo MD      85d-418e-b      /2014    Venkat



       4ca-6b6331            



       dd0a1b            



                   



                   

 

 Mode A.    Unknown  x82m2x2g-9          Ruel Robledo MD      164-4059-b      /2014    Venkat



       1g6-9723v6            



       3d87df            



                   



                   

 

 Mode A.    Unknown  32116n39-u          Ruel Robledo MD      88b-47f4-9      /2014    Venkat



       ae8-25c2ff            



       e88b60            



                   



                   

 

 Mode A.    Unknown  0452691o-1          Ruel Robledo MD      da3-45ae-b      /2014    Venkat



       k47-kk2716            



       2f93b9            



                   



                   

 

 Mode A.    Unknown  54518pnv-2          Ruel Robledo MD      835-432f-a      /2014    Venkat



       a63-o8uac7            



       6ov390            



                   



                   

 

 Mode A.    Unknown  82f61473-8          Ruel Robledo MD      87c-4db8-8      /2014    Venkat



       8l1-ayv7g7            



       r27393            



                   



                   

 

 Mode A.    Unknown  56acbaec-7          Ruel Robledo MD      n10-2m0t-w      /2014    Venkat



       ffd-3f6a87            



       87ecb2            



                   



                   

 

 Mode A.    Unknown  95lz5v12-n          Ruel Robledo MD      da5-45aa-b      /2014    Venkat



       o6i-v115av            



       cd54d9            



                   



                   

 

 Mode A.    Unknown  9ef7xab9-4          Ruel Robledo MD      531-497a-9      /2014    Venkat



       g4f-3w49pm            



       49b916            



                   



                   

 

 Mode A.    Unknown  2f8y13o0-o          Ruel Robledo MD      28d-41cc-a      /2014    Venkat



       87d-5b25b3            



       6a31c3            



                   



                   

 

 Mode A.    Unknown  06k55336-t          Ruel Robledo MD      5b1-5q45-5      /2014    Venkat



       j41-v818j2            



       j40029            



                   



                   

 

 Mode A.    Unknown  y38855y4-m          Ruel Robledo MD      2p2-6284-m      /2014    Venkat



       7ef-37r655            



       xz274f            



                   



                   

 

 Mode A.    Unknown  8uw48016-0          Ruel Robledo MD      532-4eca-a      /2014    Venkat



       9l8-0ped38            



       26ed4f            



                   



                   

 

 Mode A.    Unknown  o0f117r7-2          Ruel Robledo MD      1w6-3tgt-y      /2014    Venkat



       4u8-2hd6k9            



       85089q            



                   



                   

 

 Mode A.    Unknown  416yv862-4          Ruel Robledo MD      de4-4942-b      /2014    Venkat



       9g8-w5d174            



       26y693            



                   



                   

 

 Mode A.    Unknown  1150e42t-4          Ruel Robledo MD      h30-647i-9      /2014    Venkat



       bc6-79f8c1            



       fd9cc1            



                   



                   

 

 Mode A.    Unknown  3502nt70-5          Ruel Robledo MD      8fb-48fe-a      /2014    Venkat



       370-r9987r            



       ae81c6            



                   



                   

 

 Mode A.    Unknown  9xb42483-b          Ruel Robledo MD      c25-47k9-9      /2014    Venkat



       040-zo1016            



       203c7c            



                   



                   

 

 Mode A.    Unknown  j9105dyx-0          Ruel Robledo MD      k05-1845-l      /2014    Venkat



       r90-8s5592            



       62096z            



                   



                   

 

 Mode A.    Unknown  72sv3126-0          Ruel Robledo MD      d05-231g-9      /2014    Venkat



       g0x-67j713            



       cfdba9            



                   



                   

 

 Mode A.    Unknown  42mj1jtb-4          Ruel Robledo MD      o22-84pk-c      /2014    Venkat



       cfd-ffaf3c            



       13cd0d            



                   



                   

 

 Mode A.    Unknown  m436p7c5-1          Ruel Robledo MD      697-490d-b      /2014    Venkat



       d3z-iu2080            



       55cb35            



                   



                   

 

 Mode A.    lab order  748w728k-6          Ruel Robledo MD      eb8-4f60-a      /2014    Venkat



       108-b1390y            



       9114cb            



                   



                   

 

 Mode A.    lab order  5203i2d1-8          Ruel Robledo MD      o4r-5x38-6      /2014    Venkat



       493-42a4cf            



       44568j            



                   



                   

 

 Mode A.    lab order  v3516l93-9          Ruel Robledo MD      7x0-77z7-0      /2014    Venkat



       l0g-8z6ft8            



       dbfac3            



                   



                   

 

 Mode A.    lab order  v8g8662w-8          Ruel Robledo MD      72b-487a-a      /2014    Venkat



       fa0-9be8a3            



       43841k            



                   



                   

 

 Mode A.    lab order  fpm10fw2-4          Ruel Robledo MD      fe0-4f25-9      /2014    Venkat



       x5w-0r5872            



       45413b            



                   



                   

 

 Mode A.    lab order  gfb4z4p7-2          Ruel Robledo MD      3p3-067j-3      /2014    Venkat



       cbe-k4l810            



       093ef2            



                   



                   

 

 Mode A.    lab order  41758j50-6          Ruel Robledo MD      99a-4a45-8      /2014    Venkat



       1db-x92819            



       dki107            



                   



                   

 

 Mode A.    lab order  85x80c0r-7          Ruel Robledo MD      363-4b58-a      /2014    Venkat



       g2g-0w7d76            



       5s9200            



                   



                   

 

 Mode A.    lab order  554t5775-g          Ruel Robledo MD      6x0-4kd5-i      /2014    Venkat



       q69-980555            



       l3u095            



                   



                   

 

 Mode A.    lab order  24kp4310-n          Ruel Robledo MD      s3y-9e5t-r      /2014    Venkat



       cf5-4e83c4            



       54e1d0            



                   



                   

 

 Mode A.    lab order  50mi10d2-v          Ruel Robledo MD      574-483a-a      /2014    Venkat



       81d-c19ffb            



       902ad0            



                   



                   

 

 Mode A.    lab order  0qw9km59-o          Ruel Robledo MD      134-4ed9-b      /2014    Venkat



       m37-16y703            



       6babf9            



                   



                   

 

 Mode A.    lab order  9796h028-4          Ruel Robledo MD      828-4765-a      /2014    Venkat



       9fe-7ml362            



       8e5bde            



                   



                   

 

 Mode A.    lab order  23a45v80-7          Ruel Robledo MD      n44-8s18-1      /2014    Venkat



       0p8-w193w3            



       fa9b6d            



                   



                   

 

 Mode A.    lab order  83212l4w-m          Ruel Robledo MD      edc-4614-9      /2014    Venkat



       5m4-qr3x00            



       866f6b            



                   



                   

 

 Mode A.    lab order  937m160u-5          Ruel Robledo MD      7c7-2058-0      /2014    [facility]



       ffe-a9bc67            



       350d7b            



                   



                   

 

 Mode A.    lab order  c2r94552-1          Ruel Robledo MD      28d-427d-8      /2014    [facility]



       1cd-2r961z            



       be5ee5            



                   



                   

 

 Mode ANDRADE    lab order  66d02g6t-a          Ruel Robledo MD      u50-2702-p      /2014    [facility]



       5bc-1y2847            



       1b3833            



                   



                   

 

 Mode ANDRADE    lab order  156600qp-1          Ruel Robledo MD      0ef-41c7-9      /2014    [facility]



       a01-v50r5b            



       rx1057            



                   



                   

 

 Mode A.    lab order  0s888056-7          Ruel Robledo MD      x1j-4eb9-9      /2014    [facility]



       i23-v0yb2x            



       fef5b6            



                   



                   

 

 Mode A.    lab order  435n0xt6-0          Ruel Robledo MD      43c-4cba-      /2014    [facility]



       f54-8h5mbz            



       fabc7d            



                   



                   

 

 Mode A.    lab order  ojsv523g-7          Ruel Robledo MD      952-4620-b      /2014    [facility]



       y40-2m6d5g            



       501f79            



                   



                   

 

 Mode A.    lab order  3yp50vgw-t          Ruel Robledo MD      n53-3654-2      /2014    [facility]



       7ef-88b33a            



       cebbc2            



                   



                   

 

 Mode A.    lab order  oiy0pb8d-0          Ruel Robledo MD      54d-4a54-      /2014    [facility]



       819-238a05            



       fd86d7            



                   



                   

 

 Mode A.    lab order  f19s4878-2          Ruel Robledo MD      7da-4d06-      /2014    [facility]



       bf0-224fca            



       3b019b            



                   



                   

 

 Mode A.    lab order  mw5n265f-0          Ruel Robledo MD      1df-41eb-a      /2014    [facility]



       l35-9l11k7            



       4bc43a            



                   



                   

 

 Mode A.    lab order  3576v3t4-8          Ruel Robledo MD      t45-00c7-o      /2014    [facility]



       232-f0ef32            



       e641a8            



                   



                   

 

 Mode A.    F/U  dt0jq7m9-7      10/15  10/15    Ruel Robledo MD      s58-5079-t      /2014    [facility]



       640-3995cb            



       aa00b3            



                   



                   

 

 Mode A.    F/U  5yr562k0-8      10/15  10/15    Ruel Robledo MD      28c-4e4d-      /2014    [facility]



       660-563714            



       99eb00            



                   



                   

 

 Mode A.    F/U  7o49889i-2      10/15  10/15    Ruel Robledo MD      5ed-44ee-a      /2014    [facility]



       016-8v192a            



       31cbe9            



                   



                   

 

 Mode A.    F/U  331aaz5g-3      10/15  10/15    Ruel Robledo MD      572-47b5-b      /2014    [facility]



       f04-0l4788            



       36bc9d            



                   



                   

 

 Mode A.    F/U  01fd3j5l-x      10/15  10/15    Ruel Robledo MD      fba-46f9-9      /2014    [facility]



       d88-177359            



       0e36fe            



                   



                   

 

 Mode A.    F/U  5g05i272-6      10/15  10/15    Ruel Robledo MD      237-4f79-b      /2014    [facility]



       u40-s597t0            



       5550d9            



                   



                   

 

 Mode A.    F/U  7u13b22z-1      10/15  10/15    Ruel Robledo MD      294-4510-8      /2014    [facility]



       v0y-da0846            



       l4e430            



                   



                   

 

 Mode A.    F/U  9yi7625k-0      10/15  10/15    Ruel Robledo MD      11d-45f9-b      /2014    [facility]



       5dc-c08f23            



       6777b1            



                   



                   

 

 Mode A.    F/U  49zt8594-1      10/15  10/15    Ruel Robledo MD      166-452c-a      /2014    [facility]



       z4z-84i84a            



       b7bd63            



                   



                   

 

 Mode A.    F/U  d9015f08-b      10/15  10/15    Ruel Robledo MD      53b-45e9-a      /2014    [facility]



       s47-24j6ke            



       03s722            



                   



                   

 

 Mode A.    F/U  ue09vc60-8      10/15  10/15    Ruel Robledo MD      q57-5yml-r      /2014    [facility]



       dca-91e7f6            



       3c0b9a            



                   



                   

 

 Mode A.    F/U  3ir3977o-1      10/15  10/15    Ruel Robledo MD      y35-16g9-4      /2014    [facility]



       831-3bdc02            



       434486            



                   



                   

 

 Mode A.    F/U  379h98q6-4      10/15  10/15    Ruel Robledo MD      289-4bff-8      /2014    [facility]



       ce1-wp8527            



       71387l            



                   



                   

 

 Mode A.    F/U  9u62092k-9      10/15  10/15    Ruel Robledo MD      df0-4207-9      /2014    [facility]



       fd2-476671            



       80edab            



                   



                   

 

 Mode A.    F/U  fa55lm3a-1      10/15  10/15    Ruel Robledo MD      x01-8517-w      /2014    [facility]



       329-ff6f23            



       e3e3c2            



                   



                   

 

 Mode A.    F/U  y72aiq83-9      10/15  10/15    Ruel Robledo MD      168-455f-b      /2014    [facility]



       7x5-bfp1co            



       d44c4b            



                   



                   

 

 Mode A.    F/U  e9140661-5      10/15  10/15    Ruel Robledo MD      4c5-18k9-a      /2014    [facility]



       8z2-5ob2b1            



       5nb970            



                   



                   

 

 Mode A.    F/U  170i170x-2      10/15  10/15    Ruel Robledo MD      42a-452c-b      /2014    [facility]



       m79-4d1587            



       a61d99            



                   



                   

 

 Mode A.    F/U  1u434z0x-6      10/15  10/15    Ruel Robledo MD      5fb-4812-b      /2014    [facility]



       813-3a00d3            



       048199            



                   



                   

 

 Mode A.    F/U  73ky5ydr-0      10/15  10/15    Ruel Robledo MD      ea7-4856-b      /2014    [facility]



       7af-3d34a7            



       964be2            



                   



                   

 

 Mode A.    F/U  92c8x977-8      10/15  10/15    Ruel Robledo MD      cc7-44de-a      /2014    [facility]



       k4t-782l18            



       dd4ab9            



                   



                   

 

 Mode A.    F/U  9u046307-2      10/15  10/15    Ruel Robledo MD      bd5-4949-8      /2014    [facility]



       58b-546c78            



       2946e9            



                   



                   

 

 Mode A.    RX Request--  5356526h-5          Ruel Robledo MD    [place]  867-4fc3-8      /2014    [facility]



       fdc-p48375            



       654e68            



                   



                   

 

 Mode COLMENARES.    RX Request--  q0c4n8x9-w          MD Sherine Michael  b8r-6386-l      /2014    [facility]



       2ac-85r378            



       232f03            



                   



                   

 

 Mode COLMENARES.    RX Request--  l3287371-7          MD Sherine Michael  67b-47ec-      /2014    [facility]



       699-a1d8d4            



       22a24f            



                   



                   

 

 Mode A.    RX Request--  81797r84-4          MD Sherine Michael  p84-5u86-k      /2014    [facility]



       ea8-decd3f            



       7co893            



                   



                   

 

 Mode COLMENARES.    RX Request--  77w41mx6-6          MD Sherine Michael  c9f-2ct4-y      /2014    [facility]



       6l3-79111s            



       eeddf2            



                   



                   

 

 Mode A.    RX Request--  5law1ja3-8          MD Sherine Michael  958-4a4f-b      /2014    [facility]



       480-7defb5            



       w1829v            



                   



                   

 

 Mode COLMENARES.    RX Request--  dj67brm8-0          MD Sherine Michael  905-4502-    [facility]



       ef1-905d63            



       bc0d0f            



                   



                   

 

 Mode A.    RX Request--  h2yu23mm-9          MD Sherine Michael  464-49b0-      /2014    [facility]



       27c-5f7bb1            



       1ec9b9            



                   



                   

 

 Mode A.    RX Request--  9i910684-1          MD Sherine Michael  ea5-45ef-a      /2014    [facility]



       n56-y74rr6            



       o4125g            



                   



                   

 

 Mode A.    RX Request--  g581knz9-g          MD Sherine Michael  ec0-45a9-9      /2014    [facility]



       6df-09t337            



       37cda3            



                   



                   

 

 Mode A.    RX Request--  l88qd9b0-k          MD Sherine Michael  06a-44f5-      /2014    [facility]



       84b-279d26            



       1363d1            



                   



                   

 

 Mode A.    RX Request--  10b58792-5          MD Sherine Michael  8m1-99b1-l      /2014    [facility]



       570-119bba            



       b72ff1            



                   



                   

 

 Mode A.    RX Request--  658y4pn4-7          MD Sherine Michael  64c-4fb5-      /2014    [facility]



       y82-j62w8g            



       g71934            



                   



                   

 

 Mode A.    RX Request--  7a17o708-5          MD Sherine Michael  1a8-1k9v-x      /2014    [facility]



       ef6-l61974            



       f8b4e2            



                   



                   

 

 Mode A.    RX Request--  23f03l78-7          MD Sherine Michael  375-4921-      /2014    [facility]



       z3u-m7479t            



       t87259            



                   



                   

 

 Mode A.    RX Request--  h667g25d-1          MD Sherine Michael  924-4d49-      /2014    [facility]



       0l4-84011m            



       4n5456            



                   



                   

 

 Mode A.    RX Request--  x8710rto-7          MD Sherine Michael  0n1-2923-j      /2014    [facility]



       8q8-z5205e            



       fbcf01            



                   



                   

 

 Mode A.    RX Request--  wys13zb1-s          MD Sherine Michael  249-40c6-b      /2014    [facility]



       767-6d4437            



       z7667c            



                   



                   

 

 Mode A.    RX Request--  upq6l5xz-8          MD Sherine Michael  w0l-8r63-c      /2014    [facility]



       q80-05a7r6            



       fc92e2            



                   



                   

 

 Mode A.    RX Request--  7s9g4v48-e          MD Sherine Michael  178-4a80-b      /2014    [facility]



       z80-e00l98            



       78377m            



                   



                   

 

 Mode COLMENARES.    RX Request--  0888w2a4-h          MD Sherine Michael  cdc-4756-9      /2014    [facility]



       465-6de46c            



       d33fb1            



                   



                   

 

 Mode COLMENARES.    RX Request--  4746j9we-4          MD Sherine Michael  h1a-4360-s      /2014    [facility]



       n2g-6kbh98            



       319319            



                   



                   

 

 Mode COLMENARES.    RX Request--  k6589457-d          MD Sherine Michael  796-40ea-      /2014    [facility]



       n4e-4lr477            



       0hl685            



                   



                   

 

 Mode A.    RX Request--  xk47b33f-9          MD Sherine Michael  cb5-4f5a-b      /2014    [facility]



       587-88cf35            



       f583b5            



                   



                   

 

 Mode COLMENARES.    RX Request--  5267b1s5-e          MD Sherine Michael  y2k-8129-6    [facility]



       836-138da5            



       323b28            



                   



                   

 

 Mode A.    Refill  a7588707-h          Ruel Robledo MD    hydrocodone  1j1-7hp3-4    [facility]



     11/15  ca3-860fe3            



       8c01bf            



                   



                   

 

 Mode A.    Refill  a6j062y9-8          Ruel Robledo MD    hydrocodone  cf2-4c78-a      /2014    [place]



     11/15  7m6-6u5w4r            



       a450fc            



                   



                   

 

 Mode A.    Refill  g7391qox-8          Ruel Robledo MD    hydrocodone  c75-53m2-9    [facility]



     11/15  be3-ab9caa            



       7kw808            



                   



                   

 

 Mode A.    Refill  1u718sr7-0          Ruel Robledo MD    hydrocodone  329-4af9-    [facility]



     11/15  m3k-cr2jo0            



       71143o            



                   



                   

 

 Mode A.    Refill  48743e91-5          Ruel Robledo MD    hydrocodone  28e-4654-9      /2014    [place]



     11/15  g3i-4jgz35            



       pr773v            



                   



                   

 

 Mode A.    Refill  3da29tm3-8          Ruel Robledo MD    hydrocodone  z79-35jh-r      /2014    [place]



     11/15  652-1qy945            



       58da7f            



                   



                   

 

 Mode A.    Refill  8072abdd-c          Ruel Robledo MD    hydrocodone  52d-4075-9      /2014    [place]



     11/15  271-9u9390            



       76a6f5            



                   



                   

 

 Mode A.    Refill  19a52522-m          Ruel Robledo MD    hydrocodone  cfe-43a5-a      /2014    [place]



     11/15  x78-h2o227            



       a71d79            



                   



                   

 

 Mode A.    Refill  yt5m074p-b          Ruel Robledo MD    hydrocodone  ac8-4ae9-      /2014    [place]



     11/15  2dc-96841c            



       718553            



                   



                   

 

 Mode A.    Refill  97485vi7-3          Ruel Robledo MD    hydrocodone  606-494e-8      /2014    [place]



     11/15  6dd-7f72c0            



       09a0ba            



                   



                   

 

 Mode A.    Refill  517v08p4-j          Ruel Robledo MD    hydrocodone  d5p-60tj-9    [place]



     11/15  o1r-908e60            



       8j260v            



                   



                   

 

 Mode A.    Refill  a35467ag-l          Ruel Robledo MD    hydrocodone  66b-4f26-      /2014    [place]



     11/15  802-8d849g            



       40dc66            



                   



                   

 

 Mode A.    Refill  0uzc2k97-3          Ruel Robledo MD    hydrocodone  abf-49c7-b      /2014    [facility]



     11/15  6j3-o31t0g            



       dd05c7            



                   



                   

 

 Mode A.    Refill  yw366464-6          Ruel Robledo MD    hydrocodone  m0o-807b-0          [facility]



     11/15  657-b3b5b6            



       ee9d64            



                   



                   

 

 Mode A.    Refill  az8ouu39-9          Ruel Robledo MD    hydrocodone  271-4006-a      /2014    [place]



     11/15  2cc-f6a3ef            



       271947            



                   



                   

 

 Mode A.    Refill  76jr8y8v-9          Ruel Robledo MD    hydrocodone  825-4aea-a      /2014    [place]



     11/15  1t4-5x001u            



       116aba            



                   



                   

 

 Mode A.    Refill  cz022396-n          Ruel Robledo MD    hydrocodone  51a-4266-b      /2014    [place]



     11/15  0z4-7e0797            



       2fd1e7            



                   



                   

 

 Mode A.    Refill  89749m93-p          Ruel Robledo MD    hydrocodone  693-406b-9      /2014    [place]



     11/15  697-adebe8            



       7c89d0            



                   



                   

 

 Mode A.    Refill  33a0e266-9          Ruel Robledo MD    hydrocodone  a00-2yy2-3      /2014    [place]



     11/15  818-778caf            



       c7ffa1            



                   



                   

 

 Mode A.    Refill  7u77r93l-7          Ruel Robledo MD    hydrocodone  806-484d-b      /2014    [place]



     11/15  q68-08z067            



       100ce3            



                   



                   

 

 Mode A.    Refill  sb3r6524-2          Ruel Robledo MD    hydrocodone  fc9-476a-8      /2014    [place]



     11/15  98a-79d1ad            



       ff61de            



                   



                   

 

 Mode A.    Refill  7g800h53-u          Ruel Robledo MD    hydrocodone  u0m-5627-0      /2014    [place]



     11/15  j41-kl7849            



       7fc21d            



                   



                   

 

 Mode A.    Refill  rfgv596v-2          Ruel Robledo MD    hydrocodone  g8h-2d80-w      /2014    [place]



     11/15  08f-0bcd89            



       22a7ea            



                   



                   

 

 Mode A.    Refill  hy534ey3-h          Ruel Robledo MD    hydrocodone  g60-0027-j      /2014    [facility]



     11/15  849-65513i            



       f2d3e4            



                   



                   

 

 Mode A.    Refill  190u19rt-m          Ruel Robledo MD      509-476e-b      /2014    [facility]



       w0k-093hul            



       5e9724            



                   



                   

 

 Mode A.    Refill  giej196p-1          Ruel Robledo MD      54f-4ff7-b      /2014    [facility]



       1a9-3876q9            



       581362            



                   



                   

 

 Mode A.    Refill  w6rwd1jx-9          Ruel Robledo MD      6q2-7o37-2      /2014    [facility]



       86d-056db6            



       a9d27f            



                   



                   

 

 Mode A.    Refill  mb105s77-h          Ruel Robledo MD      j57-7101-p      /2014    [facility]



       625-cd31fd            



       26u480            



                   



                   

 

 Mode A.    Refill  7f1860c9-w          Ruel Robledo MD      537-4b8a-8      /2014    [facility]



       x2j-7618p9            



       254cce            



                   



                   

 

 Mode A.    Refill  i3f6811j-g          Ruel Robledo MD      17b-4a85-a      /2014    [facility]



       93f-d5d43b            



       0bb8c9            



                   



                   

 

 Mode A.    Refill  5l17q2yn-g          Ruel Robledo MD      dc2-4841-b      /2014    [facility]



       ff9-adbfb8            



       5u6031            



                   



                   

 

 Mode A.    Refill  g3ndleq0-l          Ruel Robledo MD      79a-430c-9      /2014    [facility]



       6f5-p013e7            



       12adcc            



                   



                   

 

 Mode A.    Refill  1475loq2-a          Ruel Robledo MD      de3-4e53-a      /2014    [facility]



       v45-63396v            



       9535cc            



                   



                   

 

 Mode A.    Refill  091c2898-6          Ruel Robledo MD      y21-7748-m      /2014    [facility]



       382-88ae71            



       dfdfb2            



                   



                   

 

 Mode A.    Refill  yp49i4sr-1          Ruel Robledo MD      8c1-8257-y      /2014    [facility]



       118-0j151j            



       k12778            



                   



                   

 

 Mode A.    Refill  297627gm-1          Ruel Robledo MD      4d8-02c7-5      /2014    [facility]



       1q4-4k0785            



       oz596h            



                   



                   

 

 Mode A.    Refill  981813zf-e          Ruel Robledo MD      47e-40b6-a      /2014    [facility]



       784-c6b40c            



       6a0bff            



                   



                   

 

 Mode A.    Refill  367z103s-l          Ruel Robledo MD      950-43cf-b      /2014    [facility]



       o12-omlh6d            



       89b311            



                   



                   

 

 Mode A.    Refill  0tdq7759-8          Ruel Robledo MD      ad3-4351-8      /2014    [facility]



       94f-2a89c3            



       7y9357            



                   



                   

 

 Mode A.    Refill  87a47s46-2          Ruel Robledo MD      346-4a91-8      /2014    [facility]



       5ca-8d56a0            



       49b739            



                   



                   

 

 Mode A.    Refill  496p6p33-x          Ruel Robledo MD      de1-4693-b      /2014    [facility]



       n3j-114590            



       f29cbc            



                   



                   

 

 Mode A.    Refill  rpc25nn0-s          Ruel Robledo MD      4q7-786y-i      /2014    [facility]



       i24-7167gh            



       bbdb68            



                   



                   

 

 Mode A.    Refill  q8v3rn9a-8          Ruel Robledo MD      af4-42fe-8      /2014    [facility]



       dbd-2b9713            



       sq1803            



                   



                   

 

 Mode A.    Refill  74f63155-1          Ruel Robledo MD      l51-8160-p      /2014    [facility]



       u40-022hth            



       e581c2            



                   



                   

 

 Mode A.    Refill  1f7p5538-6          Ruel Robledo MD      df2-460a-8      /2014    [facility]



       o18-k1vh2z            



       r8y276            



                   



                   

 

 Mode ANDRADE    Refill  3958yf00-2          Ruel Robledo MD      90e-42c5-8      /2014    [facility]



       57c-936241            



       db1f60            



                   



                   

 

 Mode COLMENARES.    Refill  54857194-8          Ruel Robledo MD      961-455b-a      /2014    [facility]



       3df-28b0c5            



       1mf745            



                   



                   

 

 Mode A.    3m f/u  u771erh1-u          Ruel Robledo MD      100-4a20-9      /2015    [facility]



       4d9-9l7880            



       935a1f            



                   



                   

 

 Mode A.    3m f/u  44215i4t-m          Ruel Robledo MD      233-4609-8      /2015    [facility]



       27c-1x7099            



       73h065            



                   



                   

 

 Mode A.    3m f/u  874yswk5-2          Ruel Robledo MD      083-46fa-9      /2015    [facility]



       0l4-c06682            



       o9i817            



                   



                   

 

 Mode A.    3m f/u  9g43a2wj-1          Ruel Robledo MD      5z7-423y-4      /2015    [facility]



       67b-15452t            



       1jn036            



                   



                   

 

 Mode A.    3m f/u  yxp442q2-9          Ruel Robledo MD      0m9-1i1e-d      /2015    [facility]



       702-s03987            



       8d7fca            



                   



                   

 

 Mode A.    3m f/u  439058a9-8          Ruel Robledo MD      16a-41d4-a      /2015    [facility]



       029-42416k            



       f442b3            



                   



                   

 

 Mode A.    3m f/u  rj7d6614-9          Ruel Robledo MD      w3r-0503-2      /2015    [facility]



       8ee-1e4586            



       7d3b81            



                   



                   

 

 Mode A.    3m f/u  95g342s1-5          Ruel Robledo MD      r1b-4403-w      /2015    [facility]



       af4-f63a5c            



       e74e86            



                   



                   

 

 Mode A.    3m f/u  z1k56a83-a          Ruel Robledo MD      914-44e2-a      /2015    [facility]



       de5-32n293            



       e40da6            



                   



                   

 

 Mode A.    3m f/u  a2357l89-u          Ruel Robledo MD      420-496a-b      /2015    [facility]



       0bc-798342            



       553f82            



                   



                   

 

 Mode A.    3m f/u  890dbcfe-b          Ruel Robledo MD      g79-79w4-3      /2015    [facility]



       9i0-469djj            



       7b101h            



                   



                   

 

 Mode A.    3m f/u  z210r67c-e          Ruel Robledo MD      n8b-9hw1-1      /2015    [facility]



       x95-v4xs09            



       e7f2de            



                   



                   

 

 Mode A.    3m f/u  8k3yp250-j          Ruel Robledo MD      2r4-2o4l-w      /2015    [facility]



       29c-aa94e6            



       855bda            



                   



                   

 

 Mode A.    3m f/u  916w5511-x          Ruel Robledo MD      9x2-0254-y      /2015    [facility]



       cf8-e4e8a1            



       sm9703            



                   



                   

 

 Mode A.    3m f/u  9261qgn2-3          Ruel Robledo MD      a5e-4u1t-8      /2015    [facility]



       q83-4t6vgp            



       5c15c8            



                   



                   

 

 Mode A.    3m f/u  n28vx735-7          Ruel Robledo MD      p73-903f-s      /2015    [facility]



       s39-1rmpc6            



       3j4806            



                   



                   

 

 Mode A.    3m f/u  yh990299-x          Ruel Robledo MD      bd3-4992-9      /2015    [facility]



       e76-gwo1to            



       999bba            



                   



                   

 

 Mode A.    3m f/u  98f5156o-a          Ruel Robledo MD      25b-4f80-b      /2015    [facility]



       4ba-373108            



       d36e87            



                   



                   

 

 Mode A.    3m f/u  824n9396-4          Ruel Robledo MD      3r0-607l-2      /2015    [facility]



       4o0-360148            



       f16f61            



                   



                   

 

 Mode A.    MRI Bi Hands  8k2h5070-6          Ruel Robledo MD      632-479e-a      /2015    [facility]



       m9o-b7j731            



       45a13a            



                   



                   

 

 Mode A.    MRI Bi Hands  k6b2f035-9          Ruel Robledo MD      099-4d1e-9      /2015    [facility]



       x9y-381262            



       ee5f19            



                   



                   

 

 Mode A.    MRI Bi Hands  87105z2k-x          Ruel Robledo MD      8v0-046k-2      /2015    [facility]



       40d-du8071            



       7309b5            



                   



                   

 

 Mode A.    MRI Bi Hands  15u48655-a          Ruel Robledo MD      d3b-8811-j      /2015    [facility]



       383-8122c4            



       11s970            



                   



                   

 

 Mode A.    MRI Bi Hands  3d439949-2          Ruel Robledo MD      007-4cf7-9      /2015    [facility]



       32b-5o0251            



       b555f0            



                   



                   

 

 Mode A.    MRI Bi Hands  1lu6x31i-2          Ruel Robledo MD      k58-27r0-9      /2015    [facility]



       j4x-19r1l8            



       u7581t            



                   



                   

 

 Mode A.    MRI Bi Hands  952isnv4-2          Ruel Robledo MD      948-492c-      /2015    [facility]



       723-ah0996            



       e0cfe9            



                   



                   

 

 Mode A.    MRI Bi Hands  5041969i-0          Ruel Robledo MD      551-4023-9      /2015    [facility]



       baf-1660d0            



       9f64b1            



                   



                   

 

 Mode A.    MRI Bi Hands  8hlj5a4l-x          Ruel Robledo MD      7m4-1jv1-i      /2015    [facility]



       72d-e5bb07            



       9a3bfd            



                   



                   

 

 Mode A.    MRI Bi Hands  q1437401-5          Ruel Robledo MD      de9-4ef9-a      /2015    [facility]



       ea6-ace31b            



       1ez651            



                   



                   

 

 Mode A.    MRI Bi Hands  o84v01yz-1          Ruel Robledo MD      843-4376-9      /2015    [facility]



       b17-n01213            



       a587b6            



                   



                   

 

 Mode A.    MRI Bi Hands  g6178678-a          Ruel Robledo MD      993-4b96-8      /2015    [facility]



       eb2-1a6f00            



       6dd90d            



                   



                   

 

 Mode A.    MRI Bi Hands  446216b2-b          Ruel Robledo MD      4ce-4eb6-b      /2015    [facility]



       037-423777            



       69f5e9            



                   



                   

 

 Mode A.    MRI Bi Hands  7587v32i-4          Ruel Robledo MD      2d6-4boj-7      /2015    [facility]



       caa-h9402d            



       dc3b4f            



                   



                   

 

 Mode A.    MRI Bi Hands  8a2ej5pg-h          Ruel Robledo MD      x14-044n-j      /2015    [facility]



       482-1e91ef            



       5bddf8            



                   



                   

 

 Mode A.    MRI Bi Hands  892d1708-8          Ruel Robledo MD      s85-08y3-e      /2015    [facility]



       277-5e7c16            



       205d92            



                   



                   

 

 Mode A.    MRI Bi Hands  m4a6b7s6-1          Ruel Robledo MD      ecc-4315-9      /2015    [place]



       6s7-a74575            



       14519x            



                   



                   

 

 Mode A.    MRI Bi Hands  qmm516b9-1          Ruel Robledo MD      9cf-4697-b      /2015    [facility]



       b3y-3152az            



       ls995p            



                   



                   

 

 Mode A.    MRI Bi Hands  50811a8s-n          Ruel Robledo MD      cbd-45e6-9      /2015    [facility]



       5o2-v792g6            



       32dbdf            



                   



                   

 

 Mode A.    MRI Bi Hands  0q1786oz-8          Ruel Robledo MD      530-4a41-8      /2015    [facility]



       26d-ac90fe            



       703997            



                   



                   

 

 Mode A.    MRI Bi Hands  14w0n2y6-h          Ruel Robledo MD      9r7-44cy-3      /2015    [facility]



       s34-97e569            



       1cd3c9            



                   



                   

 

 Mode Cruzncia  44f6o5rq-7          Ruel Robledo MD    Refill  782-4d3b-b      /2015    [facility]



       3n2-98h576            



       koq990            



                   



                   

 

 Mode Cruzncia  8505edef-6          Ruel Robledo MD    Refill  270-450f-b      /2015    [facility]



       012-d2c3dc            



       f9af57            



                   



                   

 

 Mode COLMENARES.    Orencia  370t34wn-3          Ruel Robledo MD    Refill  1a8-1984-e      /2015    [facility]



       ca5-6ab8bf            



       266198            



                   



                   

 

 Mode A.    Orencia  2gc06tfx-2          Ruel Robledo MD    Refill  903-4e90-9      /2015    [facility]



       0ca-5f26aa            



       075e9f            



                   



                   

 

 Mode COLMENARES.    Orencia  5c4769js-2          Ruel Robledo MD    Refill  8f1-6pqo-2      /2015    [facility]



       i7b-8wj17x            



       dfe7b4            



                   



                   

 

 Mode COLMENARES.    Lynnia  7se81939-2          Ruel Robledo MD    Refill  1k7-2v8j-f      /2015    [facility]



       9v8-3gh467            



       768f2f            



                   



                   

 

 Mode COLMENARES.    Orencia  v8r37385-9          Ruel Robledo MD    Refill  382-4866-8      /2015    [facility]



       1o7-4o7dl2            



       6a27c9            



                   



                   

 

 Mode A.    Orencia  i3vv458k-4          Ruel Robledo MD    Refill  5m0-6wb7-c      /2015    [facility]



       21d-ph6974            



       944730            



                   



                   

 

 Mode COLMENARES.    Orencia  3w8137q1-9          Ruel Robledo MD    Refill  50f-4a56-8      /2015    [facility]



       u00-m8e1r1            



       a13ffd            



                   



                   

 

 Mode Reyesia  83qn1c27-1          Ruel Robledo MD    Refill  v2b-9u0h-5      /2015    [facility]



       2q3-lmmw91            



       lz9060            



                   



                   

 

 Mode Reyesia  x8d2m3rm-8          Ruel Robledo MD    Refill  v63-96d6-6      /2015    [facility]



       s92-2gm37q            



       d923c8            



                   



                   

 

 Mode Reyesia  9155694m-2          Ruel Robledo MD    Refill  i04-0935-4      /2015    [facility]



       fd7-41fde1            



       431a9c            



                   



                   

 

 Mode Cruzncia  461340j4-h          Ruel Robledo MD    Refill  85e-489e-9      /2015    [facility]



       1x4-v4a6s7            



       a827a1            



                   



                   

 

 Mode Cruzncia  uha8eqoc-k          Ruel Robledo MD    Refill  i9q-2mm0-u      /2015    [facility]



       662-0c8d70            



       232365            



                   



                   

 

 Mode Cruzncia  ba3x869d-1          Ruel Robledo MD    Refill  309-4c6f-b      /2015    [facility]



       7k6-ui5l74            



       585984            



                   



                   

 

 Mode Reyesia  f074v9pr-0          Ruel Robledo MD    Refill  3ba-47c7-b      /2015    [facility]



       441-6f2cb5            



       f1f50a            



                   



                   

 

 Mode Cruzncia  8o50r508-3          Ruel Robledo MD    Refill  m8g-2489-1      /2015    [facility]



       h5s-7w6hqc            



       dcaa3a            



                   



                   

 

 Mode Cruzncia  6tb92n42-9          Ruel Robledo MD    Refill  42b-400e-b      /2015    [facility]



       o9c-3us9i7            



       37cc15            



                   



                   

 

 Mode ANDRADE    Orencia  15t28299-1          Ruel Robledo MD    Refill  996-4063-a      /2015    [facility]



       625-051468            



       002c0d            



                   



                   

 

 Mode ANDRADE    Orencia  62jgb213-4          Ruel Robledo MD    Refnick  253-43d9-b      /2015    [facility]



       e73-132081            



       l42066            



                   



                   

 

 Mode ANDRADE    Refill-  878c2i9m-8          MD Sherine Michael  1s4-2t74-j      /2015    [facility]



       40c-1aa6e2            



       b1ed41            



                   



                   

 

 Mode ANDRADE    Refill-  5a00ixi8-4          MD Sherine Michael  5de-4607-8      /2015    [facility]



       198-2b3f7a            



       6ebadf            



                   



                   

 

 Mode ANDRADE    Refill-  rv60157b-3          MD Sherine Michael  1m9-15h3-k      /2015    [facility]



       z2g-5zi7ts            



       3fd1a7            



                   



                   

 

 Mode COLMENARESJaimie    Refill-  1a4530z2-9          MD Sherine Michael  r92-8844-5      /2015    [facility]



       g04-1yyf0t            



       684672            



                   



                   

 

 Mode A.    Refill-  gim27603-2          MD Sherine Michael  267-4fc8-a      /2015    [facility]



       3v1-91rjrd            



       bcb3cb            



                   



                   

 

 Mode COLMENARES.    Refill-  7l5w54dw-4          MD Sherine Michael  147-45fc-9      /2015    [facility]



       0e4-aklwc1            



       dv241a            



                   



                   

 

 Mode FRANCISCOJaimie    Refill-  5c49p78g-w          MD Sherine Michael  6r9-85ri-k      /2015    [facility]



       507-d131fb            



       93b3eb            



                   



                   

 

 Mode FRANCISCOJaimie    Refill-  75aj9v32-6          MD Sherine Michael  21b-4ccc-a      /2015    [facility]



       q24-f06p4s            



       777d9e            



                   



                   

 

 Mode ANDRADE    Refill-  qr123j5t-5          MD Sherine Michael  73e-4d2d-8      /2015    [facility]



       ad9-407b79            



       cf7e31            



                   



                   

 

 Mode ANDRADE    Refill-  029270dg-d          MD Sherine Michael  8w7-1p48-c      /2015    [facility]



       i57-5cn692            



       d63f3d            



                   



                   

 

 Mode ANDRADE    Refill-  3q9990zq-1          MD Sherine Michael  7j9-8578-q      /2015    [facility]



       1v4-i0845x            



       f22ef9            



                   



                   

 

 Mode ANDRADE    Refill-  lu94pp64-g          MD Sherine Michael  bad-4cc0-b      /2015    [facility]



       l14-22545z            



       d30eb8            



                   



                   

 

 Mode ANDRADE    Refill-  90504e95-j          MD Sherine Michael  1h6-7410-x      /2015    [facility]



       ec0-23k044            



       e337ba            



                   



                   

 

 Mode COLMENARESJaimie    Refill-  t80es14b-y          MD Sherine Michael  276-47db-      /2015    [facility]



       62b-a4c2f1            



       408450            



                   



                   

 

 Mode COLMENARESJaimie    Refill-  a87o29s0-3          MD Sherine Michael  k5g-42ou-6      /2015    [facility]



       m1j-xi8o07            



       d15e32            



                   



                   

 

 Mode COLMENARES.    Refill-  cc2u2b54-8          MD Sherine Michael  r4x-05kt-i      /2015    [facility]



       v4l-wz8gf8            



       92edcc            



                   



                   

 

 Mode COLMENARES.    Refill-  4b9m9fe2-6          MD Sherine Michael  541-40c9-b      /2015    [facility]



       1q7-2d3218            



       51cb4f            



                   



                   

 

 Mode FRANCISCO.    Refill-  93ls24d4-8          MD Sherine Michael  ee8-451d-    [facility]



       181-5a3242            



       52a14a            



                   



                   

 

 Mode FRANCISCOJaimie    Refill-  4d40af1n-z          MD Sherine Michael  74a-41cc-8      /2015    [facility]



       412-x5582w            



       893b11            



                   



                   

 

 Mode A.    Refill-  36m551m5-w          MD Sherine Michael  700-48f4-9      /2015    [facility]



       3t5-87uv62            



       09o442            



                   



                   

 

 Mode A.    rx refill  90a45061-4          Ruel Robledo MD      23d-4896-b      /2015    [facility]



       556-ki016d            



       dff6a5            



                   



                   

 

 Mode A.    rx refill  58imp397-7          Ruel Robledo MD      11c-4318-9      /2015    [facility]



       1u0-di1667            



       a17bb6            



                   



                   

 

 Mode A.    rx refill  10z5n897-i          Ruel Robledo MD      138-4af3-b      /2015    [facility]



       2ec-4555f5            



       09dadd            



                   



                   

 

 Mode A.    rx refill  74978538-e          Ruel Robledo MD      l64-0qsq-1      /2015    [facility]



       fa1-d1eddf            



       f6dee9            



                   



                   

 

 Mode A.    rx refill  5p488w1t-r          Ruel Robledo MD      n8x-6y78-2      /2015    [facility]



       5df-d41c3a            



       8f12f1            



                   



                   

 

 Mode A.    rx refill  n3tq0902-2          Ruel Robledo MD      02b-4445-      /2015    [facility]



       w3v-8d4gqc            



       25397u            



                   



                   

 

 Mode A.    rx refill  8p289dcz-n          Ruel Robledo MD      72b-422d-a      /2015    [facility]



       m15-sx4b2h            



       o1p857            



                   



                   

 

 Mode A.    rx refill  4599d90j-1          Ruel Robledo MD      h94-25e8-e      /2015    [facility]



       3w9-b8uz17            



       d22d80            



                   



                   

 

 Mode A.    rx refill  0nf51us1-2          Ruel Robledo MD      8s7-6983-h      /2015    [facility]



       v88-3380h7            



       916749            



                   



                   

 

 Mode A.    rx refill  19v48j45-8          Ruel Robledo MD      c68-12cr-j      /2015    [facility]



       67a-f99fb1            



       9222ef            



                   



                   

 

 Mode A.    rx refill  d62o7926-q          Ruel Robledo MD      ff7-4b57-b      /2015    [facility]



       l99-285916            



       18w341            



                   



                   

 

 Mode A.    rx refill  73q6i64d-3          Ruel Robledo MD      fa6-4257-b      /2015    [facility]



       760-70041e            



       80079x            



                   



                   

 

 Mode A.    rx refill  08748139-1          Ruel Robledo MD      ed2-4209-a      /2015    [facility]



       6w9-0957i3            



       926077            



                   



                   

 

 Mode A.    rx refill  18p23s43-z          Ruel Robledo MD      5e3-6475-7      /2015    [facility]



       151-9p204t            



       5cc6a6            



                   



                   

 

 Mode A.    rx refill  94u658q2-2          Ruel Robledo MD      4a9-5l8k-i      /2015    [facility]



       u7a-39b1ax            



       ts9886            



                   



                   

 

 Mode A.    rx refill  5yhv3s4w-3          Ruel Robledo MD      g9s-67a0-e      /2015    [facility]



       o7p-q9x870            



       61ed37            



                   



                   

 

 Mode A.    rx refill  079075s6-0          Ruel Robledo MD      h1y-8re2-3      /2015    [facility]



       de0-e320d1            



       27a945            



                   



                   

 

 Mode A.    3 mo f/u  zg0b26os-9          Ruel Robledo MD      2cb-4542-a      /2015    [facility]



       25c-805722            



       246689            



                   



                   

 

 Mode A.    3 mo f/u  6z470682-r          Ruel Robledo MD      bc0-41b2-8      /2015    [facility]



       a82-aw4mc0            



       b0ee67            



                   



                   

 

 Mode A.    3 mo f/u  f3b90735-3          Ruel Robledo MD      475-4dea-b      /2015    [facility]



       334-68v013            



       f15b1f            



                   



                   

 

 Mode A.    3 mo f/u  0138k1yp-j          Ruel Robledo MD      af4-4a97-9      /2015    [facility]



       u38-89tu29            



       89ee97            



                   



                   

 

 Mode A.    3 mo f/u  82a014jh-8          Ruel Robledo MD      938-467a-a      /2015    [facility]



       86a-8c4e86            



       924878            



                   



                   

 

 Mode A.    3 mo f/u  0q700f13-y          Ruel Robledo MD      ce6-4025-b      /2015    [facility]



       036-2s151d            



       da2cdd            



                   



                   

 

 Mode A.    3 mo f/u  x99575eb-0          Ruel Robledo MD      d7l-65cx-a      /2015    [facility]



       6r3-715868            



       f97e16            



                   



                   

 

 Mode A.    3 mo f/u  o5jurep2-q          Ruel Robledo MD      cb1-4dd2-a      /2015    [facility]



       408-113fdc            



       4e014x            



                   



                   

 

 Mode A.    3 mo f/u  42q2q39z-3          Ruel Robledo MD      o80-6i9a-t      /2015    [facility]



       32f-n07112            



       c61a88            



                   



                   

 

 Mode A.    3 mo f/u  9j479y23-1          Ruel Robledo MD      daa-485f-9      /2015    [facility]



       5ac-851d8f            



       c92721            



                   



                   

 

 Mode A.    3 mo f/u  5h6506h1-9          Ruel Robledo MD      c63-12a0-8      /2015    [facility]



       baa-8u7255            



       384a54            



                   



                   

 

 Mode A.    3 mo f/u  h28124p6-1          Ruel Robledo MD      81d-4f45-b      /2015    [facility]



       0eb-t14216            



       3cef11            



                   



                   

 

 Mode A.    3 mo f/u  681859v4-h          Ruel Robledo MD      28a-4a5d-a      /2015    [facility]



       v34-d721z3            



       k9831r            



                   



                   

 

 Mode A.    3 mo f/u  87c79b51-3          Ruel Robledo MD      w65-1oo7-3      /2015    [facility]



       ccd-f040a3            



       f61aa1            



                   



                   

 

 Mode A.    3 mo f/u  510apr9j-m          Ruel Robledo MD      502-4a80-a      /2015    [facility]



       e3v-ju82bo            



       66cff9            



                   



                   

 

 Mode A.    3 mo f/u  734tbnf3-3          Ruel Robledo MD      772-41f5-8      /2015    [facility]



       14c-sp5325            



       791dcf            



                   



                   

 

 Mode A.    3 mo f/u  7d4z3079-0          Ruel Robledo MD      253-4990-8      /2015    [facility]



       5dd-44d6a8            



       95ad5e            



                   



                   

 

 Mode A.    3 mo f/u  0d7ok60f-7          Ruel Robledo MD      39d-42b6-a      /2015    [facility]



       b51-x1c163            



       ee41b3            



                   



                   

 

 Mode A.    MRI Bi Hands  cw75c65u-v          Ruel Robledo MD      22d-4afd-a      /2015    [facility]



       ba1-69l046            



       6cd4f0            



                   



                   

 

 Mode A.    MRI Bi Hands  kf438s89-b          Ruel Robledo MD      79e-482f-b      /2015    [facility]



       0fb-9bf6da            



       c99ab5            



                   



                   

 

 Mode A.    MRI Bi Hands  0327050o-q          Ruel Robledo MD      ca1-4993-b      /2015    [facility]



       z96-7y0x14            



       553aca            



                   



                   

 

 Mode A.    MRI Bi Hands  70606275-1          Ruel Robledo MD      y9f-3w7g-z      /2015    [facility]



       ff6-337352            



       c9df4d            



                   



                   

 

 Mode A.    MRI Bi Hands  3h092ct8-9          Ruel Robledo MD      be4-4c58-8      /2015    [place]



       76d-68afee            



       2c290m            



                   



                   

 

 Mode A.    MRI Bi Hands  v1yc753r-4          Ruel Robledo MD      bb1-4cc9-9      /2015    [place]



       v0i-yo2gr6            



       42165c            



                   



                   

 

 Mode A.    MRI Bi Hands  99xia4g7-o          Ruel Robledo MD      h1f-3i02-2      /2015    [facility]



       824-ux4237            



       ea80c0            



                   



                   

 

 Mode A.    MRI Bi Hands  0ju8s885-x          Ruel Robledo MD      n34-07cx-4      /2015    [facility]



       871-087716            



       8542a8            



                   



                   

 

 Mode A.    MRI Bi Hands  27619xo6-j          Ruel Robledo MD      51b-4d45-9      /2015    [place]



       6d9-4303d7            



       969568            



                   



                   

 

 Mode A.    MRI Bi Hands  6d5v89k9-2          Ruel Robledo MD      41e-4b32-b      /2015    [place]



       afe-c898fe            



       2478ba            



                   



                   

 

 Mode A.    MRI Bi Hands  qd239q3d-0          Ruel Robledo MD      208-479c-8      /2015    [place]



       912-51956d            



       x82831            



                   



                   

 

 Mode A.    MRI Bi Hands  m45p6o4x-8          Ruel Robledo MD      113-4d4a-b      /2015    [place]



       2w2-045011            



       1p8085            



                   



                   

 

 Mode A.    MRI Bi Hands  8486645h-b          Ruel Robledo MD      g6p-9179-e      /2015    [place]



       183-5846d9            



       mt5249            



                   



                   

 

 Mode A.    MRI Bi Hands  635wu05k-n          Ruel Robledo MD      518-4b54-a      /2015    [facility]



       c23-l42w6d            



       d1df26            



                   



                   

 

 Mode A.    MRI Bi Hands  30200x93-v          Ruel Robledo MD      787-4586-a      /2015    [facility]



       ccf-bcb26e            



       ee83c1            



                   



                   

 

 Mode A.    MRI Bi Hands  3s79sss1-8          Ruel Robledo MD      150-4894-9      /2015    [facility]



       1ec-27a8cb            



       gk3432            



                   



                   

 

 Mode A.    MRI Bi Hands  81ly302m-1          Ruel Robledo MD      0l1-6369-8      /2015    Venkat



       21f-bddc65            



       [id number]            



                   



                   

 

 Mode A.    1 mo f/u  658510fj-0          Ruel Robledo MD      cfd-4c06-9      /2015    Venkat



       6h8-ml36q9            



       9e3ea0            



                   



                   

 

 Mode A.    1 mo f/u  agir4352-n          Ruel Robledo MD      3ca-4cd1-b      /2015    Venkat



       y55-g61096            



       21v469            



                   



                   

 

 Mode A.    1 mo f/u  09b83622-h          Ruel Robledo MD      570-4ddf-a      /2015    Venkat



       q5z-6p2u0l            



       fb7c3c            



                   



                   

 

 Mode A.    1 mo f/u  5v6r6c35-8          Ruel Robledo MD      41b-4c46-      /2015    Venkat



       41f-73fe27            



       p35742            



                   



                   

 

 Mode A.    1 mo f/u  75n7i187-2          Ruel Robledo MD      1cc-45f2-      /2015    Venkat



       2g6-j9ndr4            



       52399o            



                   



                   

 

 Mode A.    1 mo f/u  67ocbg42-e          Ruel Robledo MD      ad9-43be-9      /2015    Venkat



       4be-991a0e            



       44e11d            



                   



                   

 

 Mode A.    1 mo f/u  3z2p7156-e          Ruel Robledo MD      x34-6a08-5      /2015    Venkat



       405-c670c9            



       17ffaf            



                   



                   

 

 Mode A.    1 mo f/u  546q8m7y-c          Ruel Robledo MD      5bc-47da-a      /2015    Venkat



       aaa-vg0042            



       8636b2            



                   



                   

 

 Mode A.    1 mo f/u  78hq24q9-x          Ruel Robledo MD      1db-411d-a      /2015    Venkat



       04c-9af6b9            



       dcc9ab            



                   



                   

 

 Mode A.    1 mo f/u  oz8kn825-7          Ruel Robledo MD      534-44b7-9      /2015    Venkat



       727-0a77e8            



       c6eace            



                   



                   

 

 Mode A.    1 mo f/u  4728am0j-3          Ruel Robledo MD      399-4f73-      /2015    Venkat



       99a-1c1de8            



       7c6f76            



                   



                   

 

 Mode A.    1 mo f/u  10762k06-j          Ruel Robledo MD      4u2-9156-7      /2015    Venkat



       270-388077            



       3c0985            



                   



                   

 

 Mode A.    1 mo f/u  tp020679-0          Ruel Robledo MD      i00-0wco-h      /2015    Venkat



       7q6-52km99            



       20z794            



                   



                   

 

 Mode A.    1 mo f/u  pf8t5n28-0          Ruel Robledo MD      47a-491e-9      /2015    Venkat



       cb9-faecfc            



       2a08fd            



                   



                   

 

 Mode A.    1 mo f/u  0k03tm20-r          Ruel Robledo MD      411-46c3-b      /2015    Venkat



       ca3-shn365            



       d3e2a5            



                   



                   

 

 Mode A.    1 mo f/u  887814h6-9          Ruel Robledo MD      675-431b-a      /2015    Venkat



       99b-3ce63e            



       6294b7            



                   



                   

 

 Mode A.    1 mo f/u  88kp68j9-f          Ruel Robledo MD      55f-4f25-      /2015    Venkat



       ea7-ec01dd            



       77f66a            



                   



                   

 

 Mode A.    Refill-  iia1990u-e          Ruel Robledo MD    [place]  817-429b-9      /2015    Venkat



       dcd-8c75fc            



       b7b7c1            



                   



                   

 

 Mode COLMENARESJaimie    Refill-  s957nwjt-4          MD Sherine Michael  749-4896-8      /2015    Venkat



       fbb-dqm045            



       aff99a            



                   



                   

 

 Mode COLMENARES.    Refill-  z589b102-t          MD Sherine Michael  r8q-76kk-5      /2015    Venkat



       0j2-x99468            



       6d7e5d            



                   



                   

 

 Mode FRANCISCOJaimie    Refill-  8gbs9358-q          MD Sherine Michael  o8r-2zv4-b      /2015    Venkat



       x9v-0l0u78            



       oc253s            



                   



                   

 

 Mode FRANCISCOJaimie    Refill-  988q8c50-3          MD Sherine Michael  5db-42cf-a      /2015    Venkat



       fca-3od993            



       a3416x            



                   



                   

 

 Mode FRANCISCOJaimie    Refill-  tmvql8d9-6          MD Sherine Michael  83f-46a8-      /2015    Venkat



       n1a-83b067            



       1e9d09            



                   



                   

 

 Mode FRANCISCOJaimie    Refill-  t7992z7q-5          MD Sherine Michael  afa-43f4-b      /2015    Venkat



       m3v-7wo846            



       8bba9b            



                   



                   

 

 Mode ANDRADE    Refill-  3jrmve4q-x          MD Sherine Michael  d55-67st-7      /2015    Venkat



       bd1-4056cb            



       fc1d3a            



                   



                   

 

 Mode ANDRADE    Refill-  v522z30y-7          MD Sherine Michael  9b6-9b69-6      /2015    Venkat



       07d-95041u            



       238afa            



                   



                   

 

 Mode AJaimie    Refill-  9s005u23-3          MD Sherine Michael  cf7-4521-      /2015    Venkat



       q6u-pcf103            



       e7dde2            



                   



                   

 

 Mode ANDRADE    Refill-  9593m913-5          MD Sherine Michael  d7g-456v-n      /2015    Venkat



       e4w-1zw30x            



       40bc74            



                   



                   

 

 Mode AJaimie    Refill-  0w39q027-m          MD Sherine Michael  fff-4c77-    Venkat



       sridevi-4570e8            



       e83a5a            



                   



                   

 

 Mode AJaimie    Refill-  3499p12b-2          MD Sherine Michael  1k9-513s-4    [facility]led[facility]



       93f-f651f6            



       6x0592            



                   



                   

 

 Mode FRANCISCOaJimie    Refill-  2z70x039-i          MD Sherine Michael  q74-3hol-2    Venkat



       a5j-a35nw2            



       8361b8            



                   



                   

 

 Mode FRANCISCOJaimie    Refill-  bis2p74z-j          MD Sherine Michael  77d-463e-    Venkat



       0be-ef1ef8            



       13l398            



                   



                   

 

 Mode AJaimie    Refill-  955rr0p4-i          MD Sherine Michael  772-4b39-    [facility]



       2b2-6007ou            



       197335            



                   



                   

 

 Mode FRANCISCOJaimie    Refill-  26z7497s-o          MD Sherine Michael  l6t-7cc2-v      /2015    [facility]



       38d-6450da            



       8qk375            



                   



                   

 

 Mode FRANCISCOJaimie    RX Request--  72h9226e-y          MD Sherine Michael  1z2-2i7u-8    Venkat



       02b-a040ea            



       fq3804            



                   



                   

 

 Mode FRANCISCO.    RX Request--  ud3049ck-4          MD Sherine Michael  479-450b-    Venkat



       79e-4434ec            



       bc64a1            



                   



                   

 

 Mode FRANCISCOJaimie    RX Request--  03py4117-1          MD Sherine Michael  602-4ec0-    Venkat



       0bf-8177de            



       05482r            



                   



                   

 

 Mode A.    RX Request--  f0m9h5w4-7          MD Sherine Michael  191-4e01-b      /2015    Venkat



       752-89q736            



       5bc1b6            



                   



                   

 

 Mode A.    RX Request--  i45v6432-z          MD Sherine Michael  a62-48t6-1      /2015    [facility]



       5ed-0dl991            



       2020ce            



                   



                   

 

 Mode FRANCISCO.    RX Request--  vud2f65t-4          MD Sherine Michael  560-430b-a      /2015    Venkat



       08a-r4281e            



       dded25            



                   



                   

 

 Mode A.    RX Request--  p46oz441-p          MD Sherine Michael  961-4d18-a      /2015    Venkat



       59f-b57a7f            



       e2517x            



                   



                   

 

 Mode A.    RX Request--  7q2misv7-7          MD Sherine Michael  4f8-331i-t      /2015    Venkat



       4ec-465472            



       1cee56            



                   



                   

 

 Mode A.    RX Request--  g2gjsa62-7          MD Sherine Michael  452-4503-      /2015    Venkat



       4t5-5qyx8x            



       8319e4            



                   



                   

 

 Mode A.    RX Request--  0m13j561-2          MD Sherine Michael  edf-4828-b      /2015    Venkat



       ba4-b69f34            



       a6bcfb            



                   



                   

 

 Mode A.    RX Request--  ij43q722-2          MD Sherine Michael  301-42ab-      /2015    Venkat



       023-8be7a1            



       82c021            



                   



                   

 

 Mode A.    RX Request--  49l9a6u2-7          MD Sherine Michael  6i9-7090-7    Venkat



       72d-gl8562            



       2e92ba            



                   



                   

 

 Mode A.    RX Request--  2rfrr7j6-f          MD Sherine Michael  394-487e-    [facility]



       t10-9p4d6k            



       643a64            



                   



                   

 

 Mode A.    RX Request--  6jy084cl-7          MD Sherine Michael  37c-4b86-    Venkat



       8cd-dbde18            



       a2f83c            



                   



                   

 

 Mode A.    RX Request--  45m7avr3-s          MD Sherine Michael  u02-94p7-q      /2015    Venkat



       v69-73ew51            



       mr4192            



                   



                   

 

 Mode A.    RX Request--  3m1d0p2k-d          MD Sherine Michael  9bd-4817-    Venkat



       x2c-fiv383            



       7y8642            



                   



                   

 

 Mode A.    RX Request--  276w1aid-7          MD Sherine Michael  71d-4379-    Venkat



       baa-b50b4e            



       72fffa            



                   



                   

 

 Mode A.    RX Request--  5w37155q-a          MD Sherine Michael  8v4-948v-9    Venkat



       259-c589a8            



       f61a7e            



                   



                   

 

 Mode A.    RX Request--  o232jl29-2          MD Sherine Michael  567-4bf0-    [facility]



       0d5-8067z8            



       114da7            



                   



                   

 

 Mode A.    RX Request--  tm5878nk-7          MD Sherine Michael  j34-18h4-f      /2015    Venkat



       v5m-39j9sz            



       d7581k            



                   



                   

 

 Mode A.    RX Request--  6f190ho7-j          MD Sherine Michael  u18-4euh-f      /2015    Venkat



       933-lo2393            



       028138            



                   



                   

 

 Mode A.    RX Request--  0xqgvr5u-6          MD Sherine Michael  m95-3gno-q      /2015    Venkat



       155-60ee51            



       95i068            



                   



                   

 

 Mode A.    RX Request--  o930i22c-j          MD Sherine Michael  ed8-43d2-a      /2015    [facility]



       874-27ae43            



       f907ff            



                   



                   

 

 Mode A.    RX Request--  337590x1-i          MD Sherine Michael  850-4d88-a      /2015    [facility]



       9aa-50fb95            



       2f2bcd            



                   



                   

 

 Mode A.    RX Request--  1ux7nsy5-9          MD Sherine Michael  914-4648-      /2015    [facility]



       t70-91332t            



       l5551u            



                   



                   

 

 Mode A.    RX Request--  u58y12xu-9          MD Sherine Michael  w6b-2496-x      /2015    [facility]



       0y6-9p936n            



       2ebcff            



                   



                   

 

 Mode A.    RX Request--  7r61lo8t-0          MD Sherine Michael  723-4530-b      /2015    [facility]



       4ee-a5e0b2            



       056540            



                   



                   

 

 Mode A.    RX Request--  387174x9-6          MD Sherine Michael  ed2-463a-    [facility]



       6j6-3284o5            



       b20a5d            



                   



                   

 

 Mode A.    RX Request--  2vwflqs9-w          MD Sherine Michael  83a-4b1b-    [facility]



       033-p4837e            



       0fe48e            



                   



                   

 

 Mode A.    RX Request--  d8m2nj41-2          MD Sherine Michael  4e7-5377-3          [facility]



       46d-6623c2            



       8sf485            



                   



                   

 

 Mode A.    RX Request--  7gvr5124-y          MD Sherine Michael  m3d-57aj-4    [facility]



       608-70028y            



       456684            



                   



                   

 

 Mode A.    RX Request--  2096946r-2          MD Sherine Michael  k0m-4555-5    Venkat



       dfd-0cd9b8            



       5v0375            



                   



                   

 

 Mode A.    RX Request--  635ceceb-d          MD Sherine Michael  x50-85uf-u      /2015    Venkat



       l42-zg0e73            



       8fef0b            



                   



                   

 

 Mode A.    RX Request--  oi1676s6-9          MD Sherine Michael  890-46ec-8      /2015    Venkat



       v11-0f5t76            



       c25e8c            



                   



                   

 

 Mode A.    1 mo f/u  eq462wn7-1          Ruel Robledo MD      a9e-49v0-e      /2015    Venkat



       e62-2kv2uz            



       cca0e7            



                   



                   

 

 Mode A.    1 mo f/u  75o9et21-5          Ruel Robledo MD      b65-2489-s      /2015    Venkat



       q4a-461s5j            



       568c2a            



                   



                   

 

 Mode A.    1 mo f/u  81k5k4f5-5          Ruel Robledo MD      509-484b-b      /2015    Venkat



       3bb-m4680m            



       db76d4            



                   



                   

 

 Mode A.    1 mo f/u  0a00765j-5          Ruel Robledo MD      667-4c0e-8      /2015    Venkat



       v96-26mg8c            



       c95b5f            



                   



                   

 

 Mode A.    1 mo f/u  411m3jr4-s          Ruel Robledo MD      z12-5082-7      /2015    Venkat



       576-76910d            



       66bcb6            



                   



                   

 

 Mode A.    1 mo f/u  2yi843j6-3          Ruel Robledo MD      fa1-418e-b      /2015    Venkat



       b69-341943            



       26q006            



                   



                   

 

 Mode A.    1 mo f/u  22axp22y-o          Ruel Robledo MD      daf-4550-8      /2015    Venkat



       f31-7q2567            



       16fb46            



                   



                   

 

 Mode A.    1 mo f/u  o588n707-p          Ruel Robledo MD      794-4f90-8      /2015    Venkat



       008-cc0f50            



       fb91d1            



                   



                   

 

 Mode A.    1 mo f/u  98exh39v-9          Ruel Robledo MD      096-4b69-b      /2015    Venkat



       210-d79da2            



       bea5db            



                   



                   

 

 Mode A.    1 mo f/u  q4mu7nx2-3          Ruel Robledo MD      259-4967-8      /2015    Venkat



       062-96q231            



       4yr022            



                   



                   

 

 Mode A.    1 mo f/u  3040p75z-3          Ruel Robledo MD      r26-6jqe-6      /2015    Venkat



       acb-865cb2            



       65n025            



                   



                   

 

 Mode A.    1 mo f/u  s56w46hq-f          Ruel Robledo MD      07f-4cd2-a      /2015    Venkat



       521-7ec1c6            



       25b5af            



                   



                   

 

 Mode A.    1 mo f/u  dg4uwd2a-c          Ruel Robledo MD      040-45a3-9      /2015    Venkat



       176-41d236            



       70ff04            



                   



                   

 

 Mode A.    1 mo f/u  61mu45gs-3          Ruel Robledo MD      27d-4ac6-8      /2015    Venkat



       7ba-6ed98b            



       c4n246            



                   



                   

 

 Mode A.    1 mo f/u  iu233140-y          Ruel Robledo MD      t87-6755-4      /2015    Venkat



       9x4-b581w9            



       4b45c4            



                   



                   

 

 Mode A.    1 mo f/u  25942e1j-g          Ruel Robledo MD      09f-4361-b      /2015    Venkat



       07f-85b0b7            



       0fc0d4            



                   



                   

 

 Mode A.    1 mo f/u  90c581h0-z          Ruel Robledo MD      f3d-3u11-e      /2015    Venkat



       403-75530p            



       7f3a98            



                   



                   

 

 Mode A.    MRI  b7r50g8z-d          Ruel Robledo MD      3h2-3jo0-3      /2015    Venkat



       7s7-2fbs70            



       a2b1df            



                   



                   

 

 Mode A.    MRI  a490v301-7          Ruel Robledo MD      210-484d-      /2015    Venkat



       81c-17d0f2            



       bad6a3            



                   



                   

 

 Mode A.    MRI  rr8c1i8m-6          Ruel Robledo MD      o1a-7593-8      /2015    Venkat



       cfe-825e56            



       2l7141            



                   



                   

 

 Mode A.    MRI  k4130pw3-a          Ruel Robledo MD      577-4a7f-    Venkat



       421-ccb48f            



       5m4682            



                   



                   

 

 Mode A.    MRI  74xi7859-9          Ruel Robledo MD      779-4db3-      /2015    Venkat



       2bc-c4c0c2            



       38bca2            



                   



                   

 

 Mode A.    MRI  75vy2530-5          Ruel Robledo MD      701-4031-      /2015    Venkat



       s4h-d86a44            



       38243u            



                   



                   

 

 Mode A.    MRI  p9d5ngk9-k          Ruel Robledo MD      1w1-8cu9-3      /2015    Venkat



       60b-95448j            



       35f2e6            



                   



                   

 

 Mode A.    MRI  am704n25-2          Ruel Robledo MD      z16-9z2x-5    Venkat



       102-c7db4a            



       c778ed            



                   



                   

 

 Mode A.    MRI  94p1y9rf-u          Ruel Robledo MD      ea4-499b-      /2015    Venkat



       4db-88235v            



       22k485            



                   



                   

 

 Mode A.    MRI  5g576ro3-e          Ruel Robledo MD      768-4ea3-      /2015    Venkat



       ce7-a2964r            



       3a1b24            



                   



                   

 

 Mode A.    MRI  1ut4j136-1          Ruel Robledo MD      5c3-3fn9-s      /2015    Venkat



       00a-0780fc            



       a96e09            



                   



                   

 

 Mode A.    MRI  kz494uh9-4          Ruel Robledo MD      cfc-4425-b      /2015    Venkat



       zhanna-e03146            



       825336            



                   



                   

 

 Mode A.    MRI  b3sf9egs-5          Ruel Robledo MD      039-4797-a      /2015    Venkat



       z06-96y071            



       5ecadb            



                   



                   

 

 Mode A.    MRI  dv09d0c4-b          Ruel Robledo MD      11f-4470-b      /2015    Venkat



       289-o8k320            



       90y438            



                   



                   

 

 Mode A.    MRI  71609d40-6          Ruel Robledo MD      ccc-47e9-b      /2015    Venkat



       i3u-3319d8            



       c48df6            



                   



                   

 

 Mode A.    MRI  4187j5m1-q          Ruel Robledo MD      721-4e29-b      /2015    Venkat



       e99-8k8i49            



       836f03            



                   



                   

 

 Mode A.    1 mo f/u  22p65986-9          Ruel Robledo MD      k0s-6kaf-8      /2015    Venkat



       940-544ed6            



       ddadd9            



                   



                   

 

 Mode A.    1 mo f/u  1y835151-4          Ruel Robledo MD      z70-11i1-l      /2015    Venkat



       97d-bdba4c            



       5f8d73            



                   



                   

 

 Mode A.    1 mo f/u  job3oax7-4          Ruel Robledo MD      57e-47d0-8      /2015    Venkat



       f6t-o6a3z6            



       l7196b            



                   



                   

 

 Mode A.    1 mo f/u  o490o1g8-9          Ruel Robledo MD      6g3-14l9-y      /2015    Venkat



       v23-8m73c6            



       852e7c            



                   



                   

 

 Mode A.    1 mo f/u  ts3m65mi-4          Ruel Robledo MD      bd8-4da1-a      /2015    Venkat



       665-7gm874            



       jv765y            



                   



                   

 

 Mode A.    1 mo f/u  br5j9391-q          Ruel Robledo MD      29d-479b-a      /2015    Venkat



       s0z-8r342k            



       1785fe            



                   



                   

 

 Mode A.    1 mo f/u  caaaeafc-2          Ruel Robledo MD      1bd-4938-9      /2015    Venkat



       80f-e224bc            



       57df70            



                   



                   

 

 Mode A.    1 mo f/u  6wo54y4z-3          Ruel Robledo MD      340-4657-9      /2015    Venkat



       6i0-2ojc6w            



       b35db3            



                   



                   

 

 Mode A.    1 mo f/u  938347i9-h          Ruel Robledo MD      8fb-417d-a      /2015    Venkat



       43e-0128ee            



       d6cf93            



                   



                   

 

 Mode A.    1 mo f/u  d482d21c-3          Ruel Robledo MD      i44-0c95-0      /2015    Venkat



       52c-416412            



       425cd5            



                   



                   

 

 Mode A.    1 mo f/u  3f3u9519-6          Ruel Robledo MD      964-4d45-a      /2015    Venkat



       05d-e2be10            



       1u479u            



                   



                   

 

 Mode A.    1 mo f/u  ce300qt6-m          Ruel Robledo MD      549-4844-b      /2015    Venkat



       2q2-1a6514            



       43757t            



                   



                   

 

 Mode A.    1 mo f/u  kl9r0015-q          Ruel Robledo MD      334-4faf-a      /2015    Venkat



       b88-e16k36            



       18a501            



                   



                   

 

 Mode A.    1 mo f/u  251zm377-6          Ruel Robledo MD      i40-8559-c      /2015    Venkat



       6ee-7bc9b3            



       232724            



                   



                   

 

 Mode A.    1 mo f/u  45226348-5          Ruel Robledo MD      54e-4b04-8      /2015    Venkat



       f13-8jfd54            



       1d65a3            



                   



                   

 

 Mode A.    1 mo f/u  h97aky29-5          Ruel Robledo MD      v9w-83y0-0      /2015    Venkat



       632-9x842b            



       decb6d            



                   



                   

 

 Mode A.    1 mo f/u  56222413-1          Ruel Robledo MD      5m9-4842-g      /2015    Venkat



       398-4c60e6            



       d7c90d            



                   



                   

 

 Mode A.    1 mo f/u  179bfe15-3          Ruel Robledo MD      551-433a-a      /2015    Venkat



       b39-6l510z            



       1af5cf            



                   



                   

 

 Mode A.    1 mo f/u  014q3010-m          Ruel Robledo MD      31c-4824-b      /2015    Venkat



       r1m-2082m3            



       00b311            



                   



                   

 

 Mode A.    1 mo f/u  33343o57-2          Ruel Robledo MD      133-4df7-a      /2015    Venkat



       be4-4d6fd7            



       6p294v            



                   



                   

 

 Mode A.    1 mo f/u  er00103o-j          Ruel Robledo MD      6o3-1b63-b      /2015    Venkat



       q50-981w53            



       0j9956            



                   



                   

 

 Mode A.    1 mo f/u  oq5i626d-5          Ruel Robledo MD      548-4ae7-8      /2015    Venkat



       k6f-2400q9            



       636766            



                   



                   

 

 Mode A.    1 mo f/u  cc4993h5-h          Ruel Robledo MD      g83-55m9-6      /2015    Venkat



       5cb-f92aab            



       10636k            



                   



                   

 

 Mode A.    1 mo f/u  6498b78f-3          Ruel Robledo MD      n14-9741-l      /2015    Venkat



       4w8-0o3407            



       5581f0            



                   



                   

 

 Mode A.    1 mo f/u  3i8ma404-c          Ruel Robledo MD      da3-4ae3-8      /2015    Venkat



       7b6-8su3xn            



       c90f99            



                   



                   

 

 Mode A.    1 mo f/u  646y109k-e          Ruel Robledo MD      y90-23m7-l      /2015    Venkat



       2ae-830a25            



       234b07            



                   



                   

 

 Mode COLMENARESJaimie    Refill-  2314166r-9          MD Sherine Michael  405-485f-a      /2015    Venkat



       ac2-9355e0            



       ku1586            



                   



                   

 

 Mode COLMENARES.    Refill-  kr6eupgj-1          MD Sherine Michael  812-40dc-9      /2015    Venkat



       c32-b0a8k9            



       1cdc7b            



                   



                   

 

 Mode FRANCISCOJaimie    Refill-  yd9ks0fn-7          MD Sherine Michael  5eb-45af-9      /2015    Venkat



       153-ef0b93            



       e0d1d0            



                   



                   

 

 Mode FRANCISCOJaimie    Refill-  hpx22734-1          MD Sherine Michael  76f-4df1-9      /2015    Venkat



       2e4-021v03            



       af44d6            



                   



                   

 

 Mode ANDRADE    Refill-  8ca048b9-2          MD Sherine Michael  149-46ee-9      /2015    Venkat



       ce2-ae5e92            



       4bcefb            



                   



                   

 

 Mode ANDRADE    Refill-  9416f7xf-n          MD Sherine Michael  ec0-468a-a      /2015    Venkat



       02c-q14619            



       3be41c            



                   



                   

 

 Mode ANDRADE    Refill-  l287115p-1          MD Sherine Michael  bfc-488f-a      /2015    Venkat



       6o8-873478            



       f8ea3c            



                   



                   

 

 Mode COLMENARES.    Refill-  227l2fg4-2          MD Sherine Michael  193-42ff-b      /2015    Venkat



       7n0-pbb28d            



       l8l873            



                   



                   

 

 Mode COLMENARES.    Refill-  48fq8816-0          MD Sherine Michael  018-475d-a      /2015    Venkat



       40a-8dj453            



       762eb9            



                   



                   

 

 Mode ANDRADE    Refill-  7b5284gq-l          MD Sherine Michael  x71-2lbd-3      /2015    Venkat



       ec2-1cf93f            



       97ce05            



                   



                   

 

 Mode COLMENARES.    Refill-  vwz59953-8          MD Sherine Michael  bfb-4497-a      /2015    Venkat



       z65-4v9773            



       92da5f            



                   



                   

 

 Mode COLMENARES.    Refill-  8t7n2001-3          MD Sherine Michael  989-433e-b      /2015    Venkat



       6d7-ifr9mc            



       47v725            



                   



                   

 

 Mode A.    Refill-  3yr47v84-5          MD Sherine Michael  453-4449-b      /2015    Venkat



       ba2-3c887o            



       85d59b            



                   



                   

 

 Mode A.    Refill-  e649ak7b-z          MD Sherine Michael  v1a-18t1-m      /2015    Venkat



       00e-75f33c            



       ffd0fe            



                   



                   

 

 Mode A.    Refill-  43q42l9l-i          MD Sherine Michael  26e-410f-8      /2015    Venkat



       g4x-emgqj0            



       7447c7            



                   



                   

 

 Mode A.    Refill-  71x16cf1-5          MD Sherine Michael  c2i-8w5s-i      /2015    Venkat



       563-8cb5a5            



       bpl934            



                   



                   

 

 Mode A.    refill  91z9kc0t-5          Ruel Robledo MD      46a-4f0f-a      /2015    Venkat



       0w0-z4c4q6            



       d3f06c            



                   



                   

 

 Mode A.    refill  1t31002x-0          Ruel Robledo MD      0df-4c19-9      /2015    Venkat



       0n6-11s976            



       c31df4            



                   



                   

 

 Mode A.    refill  05mt7763-2          Ruel Robledo MD      a47-075y-x      /2015    Venkat



       5ca-ce78f0            



       7k8745            



                   



                   

 

 Mode A.    refill  wgb6za54-0          Ruel Robledo MD      798-4de3-8      /2015    Robledo



       364-fb20f3            



       d5ea86            



                   



                   

 

 Mode A.    refill  7m8221o2-8          Ruel Robledo MD      8j0-3e2a-9      /2015    Robledo



       fda-9db1ee            



       55768c            



                   



                   

 

 Mode A.    refill  55v7bow2-4          Ruel Robledo MD      s16-8mzr-8      /2015    Robledo



       6t0-502bi9            



       137caf            



                   



                   

 

 Mode A.    refill  5k7tbp3w-5          Ruel Robledo MD      26e-4638-9      /2015    Robledo



       0fe-7u694y            



       a64ad7            



                   



                   

 

 Mode A.    refill  8j332542-0          Ruel Robledo MD      4h2-53z0-w      /2015    Robledo



       367-037368            



       a00d66            



                   



                   

 

 Mode A.    refill  64qpo855-r          Ruel Robledo MD      49c-4d52-b      /2015    Robledo



       11f-a614ef            



       f2b2c6            



                   



                   

 

 Mode A.    Refill  34u440wf-9          Ruel Robledo MD      6ef-4f06-a      /2015    Robledo



       00b-a30ca3            



       d1d4bf            



                   



                   

 

 Mode A.    Refill  825u5iv0-j          Ruel Robledo MD      437-4e93-b      /2015    Robledo



       7ac-e261fe            



       k41365            



                   



                   

 

 Mode A.    Refill  kw64366o-z          Ruel Robledo MD      c88-7681-f      /2015    Robledo



       500-493e1b            



       x8186m            



                   



                   

 

 Mode A.    Refill  852357l6-6          Ruel Robledo MD      374-47d9-9      /2015    Robledo



       058-30abd3            



       bebe9a            



                   



                   

 

 Mode A.    Refill  33h983h7-m          Ruel Robledo MD      6o0-7521-e      /2015    Robledo



       69a-386a55            



       220601            



                   



                   

 

 Mode A.    Refill  55116f7i-d          Ruel Robledo MD      69e-4e81-a      /2015    Venkat



       bf0-910aec            



       4f6ba4            



                   



                   

 

 Mode A.    Refill  et3c9x90-5          Ruel Robledo MD      w2h-91ip-8      /2015    Venkat



       k13-7459ug            



       3cba8a            



                   



                   

 

 Mode A.    Refill  3e5p98ct-6          Ruel Robledo MD      611-42ff-8      /2015    Venkat



       y3j-7k74t3            



       a22c37            



                   



                   

 

 Mode A.    Refill  r2718p56-7          Ruel Robledo MD      8a8-58h6-a      /2015    Venkat



       4bf-716615            



       rv771v            



                   



                   

 

 Mode A.    refill  5i98i362-k          Ruel Robledo MD      q30-8x57-u      /2015    Venkat



       fdb-b72a1e            



       dd8c6c            



                   



                   

 

 Mode A.    refill  71wob14y-6          Ruel Robledo MD      105-4b48-b      /2015    Robledo



       fd6-5u6753            



       b4f71f            



                   



                   

 

 Mode A.    refill  q5swr3x9-3          Ruel Robledo MD      054-4867-9      /2015    Venkat



       l81-bfo22p            



       9nv086            



                   



                   

 

 Mode A.    refill  891u01i0-7          Ruel Robledo MD      x73-0k04-n      /2015    Robledo



       920-741af0            



       4b6ed5            



                   



                   

 

 Mode A.    refill  8e3dnk61-q          Ruel Robledo MD      i85-7749-c      /2015    Venkat



       ca8-5376a1            



       72ac33            



                   



                   

 

 Mode A.    refill  465k8rk9-8          Ruel Robledo MD      dbe-4cc8-a      /2015    Venkat



       13f-e0903t            



       3230ea            



                   



                   

 

 Mode A.    Refill  4y0ld8ze-0          Ruel Robledo MD      bd3-4f69-b      /2015    Venkat



       58b-87cc4b            



       990bb7            



                   



                   

 

 Mode A.    Refill  75gp576h-0          Ruel Robledo MD      6ac-4b40-a      /2015    Venkat



       4m1-u07bfo            



       nd7851            



                   



                   

 

 Mode A.    Refill  ezhre48b-h          Ruel Robledo MD      v3e-5khb-x      /2015    Venkat



       106-d8b1c6            



       0ceed6            



                   



                   

 

 Mode A.    Refill  d19281c3-0          Ruel Robledo MD      4df-4f09-a      /2015    Venkat



       1eb-15335b            



       1b8ce7            



                   



                   

 

 Mode A.    Refill  uql9i02i-x          Ruel Robledo MD      408-42ac-b      /2015    Venkat



       7dc-k20357            



       4c2f5c            



                   



                   

 

 Mode A.    Refill  u3v60c84-b          Ruel Robledo MD      66f-4cbc-8      /2015    Venkat



       893-1951f2            



       701e57            



                   



                   

 

 Mode A.    Unknown  2q5u5c79-0          Ruel Robledo MD      065-41e4-9      /2015    Venkat



       938-a339a0            



       910175            



                   



                   

 

 Mode A.    Unknown  9040c5w9-e          Ruel Robledo MD      bb7-416f-9      /2015    Venkat



       edf-820fd5            



       5950fa            



                   



                   

 

 Mode A.    Unknown  ptd91yn1-4          Ruel Robledo MD      1de-4ca7-b      /2015    Venkat



       6fc-ju3139            



       6b3da7            



                   



                   

 

 Mode A.    Unknown  ie636937-g          Ruel Robledo MD      y69-8358-4      /2015    Venkat



       0t4-8blzr1            



       1c07e8            



                   



                   

 

 Mode A.    Unknown  31584226-f          Ruel Robledo MD      fc9-4e35-b      /2015    Venkat



       3af-a44fb1            



       746877            



                   



                   

 

 Mode A.    Unknown  d72e134m-h          Ruel Robledo MD      711-4822-a      /2015    Venkat



       47f-h2o512            



       bdb13a            



                   



                   

 

 Mode A.    Unknown  t3438696-g          Ruel Robledo MD      9cd-4858-9      /2015    Venkat



       796-a63a06            



       136e04            



                   



                   

 

 Mode A.    Unknown  66563k51-9          Ruel Robledo MD      db3-4a86-a      /2015    Venkat



       0q0-0xe8p2            



       y6348s            



                   



                   

 

 Mode A.    Unknown  18a5u13v-y          Ruel Robledo MD      15a-4e19-a      /2015    Venkat



       h32-0i48c2            



       9974a7            



                   



                   

 

 Mode A.    Unknown  a9g8227x-x          Ruel Robledo MD      n3k-24i2-i      /2015    Venkat



       ce9-c0b81a            



       a38bae            



                   



                   

 

 Mode A.    Unknown  e2og165g-3          Ruel Robledo MD      903-4f23-a      /2015    Venkat



       2cc-34e67d            



       d79191            



                   



                   

 

 Mode A.    Unknown  e47470xn-s          Ruel Robledo MD      d4n-9499-5      /2015    Venkat



       fb0-61ef50            



       67f5c7            



                   



                   

 

 Mode A.    Unknown  1m3eub1v-7          Ruel Robledo MD      l0c-9490-3      /2015    Venkat



       bb3-fo2363            



       bafb42            



                   



                   

 

 Mode A.    Unknown  3m3sn12v-1          Ruel Robledo MD      ea7-43a0-8      /2015    Venkat



       t90-a4730e            



       558b12            



                   



                   

 

 Mode A.    Unknown  2v5d7084-8          Ruel Robledo MD      0r6-842p-9    Venkat



       u1u-t2j60i            



       bdf8fb            



                   



                   

 

 Mode A.    Unknown  2q063955-x          Ruel Robledo MD      0h8-3184-8      /2015    Venkat



       0h9-w6127m            



       ad9c58            



                   



                   

 

 Mode A.    Unknown  3k713d11-4          Ruel Robledo MD      6n2-81n5-g      /2015    Venkat



       6v6-47a1z6            



       0bfebd            



                   



                   

 

 Mode A.    Unknown  66n6lcv5-7          Ruel Robledo MD      012-44ef-b      /2015    Venkat



       ab9-8c3941            



       45ab06            



                   



                   

 

 Mode A.    Unknown  p8y8owbx-6          Ruel Robledo MD      780-4529-8      /2015    Venkat



       89e-00fcdb            



       26d865            



                   



                   

 

 Mode A.    Unknown  29e71f45-e          Ruel Robledo MD      4x3-3171-4      /2015    Venkat



       6da-775b7f            



       e905bc            



                   



                   

 

 Mode A.    Unknown  b727uv2d-5          Ruel Robledo MD      322-4236-b      /2015    Venkat



       824-3ns162            



       978797            



                   



                   

 

 Mode A.    Unknown  ey3ks292-9          Ruel Robledo MD      81a-4439-b      /2015    Venkat



       567-824a8c            



       5kw452            



                   



                   

 

 Mode A.    Unknown  611688s6-h          Ruel Robledo MD      w68-2400-6      /2015    Venkat



       b0j-73315w            



       74b9ec            



                   



                   

 

 Mode A.    Unknown  51w07s10-s          Ruel Robledo MD      x12-52b3-r      /2015    Venkat



       83b-5257e6            



       f0e46e            



                   



                   

 

 Mode A.    Unknown  188j45wn-n          Ruel Robledo MD      e16-42a0-h      /2015    Venkat



       b5x-42369b            



       373480            



                   



                   

 

 Mode A.    Unknown  08l60343-a          Ruel Robledo MD      603-4b68-8      /2015    Venkat



       3d5-4pq0cb            



       q43378            



                   



                   

 

 Mode A.    orencia  08539bg0-k          Ruel Robledo MD    refill  50c-4a48-9      /2016    Venkat



       728-0f34ad            



       7a3c44            



                   



                   

 

 Mode COLMENARES.    lynnia  4hn7d47f-0          Ruel Robledo MD    refill  9f9-86w1-7      /2016    Venkat



       27f-2faab8            



       a20feb            



                   



                   

 

 Mode COLMENARES.    lynnia  48992v4z-8          Ruel Robledo MD    refill  db7-4bf9-a      /2016    Venkat



       ef2-45a81a            



       f8e61c            



                   



                   

 

 Mode COLMENARES.    laylancia  9hl287af-a          Ruel Robledo MD    refill  931-4de6-8      /2016    Venkat



       890-9bbcc5            



       cc3e2b            



                   



                   

 

 Mode COLMENARES.    laylancia  fa34370e-3          Ruel Robledo MD    refill  38b-46a0-b      /2016    Venkat



       6n5-721f60            



       dj544k            



                   



                   

 

 Mode COLMENARES.    laylancia  1204366u-9          Ruel Robledo MD    refill  993-4e3e-8      /2016    Venkat



       786-e6b6d1            



       8e3d26            



                   



                   

 

 Mode COLMENARES.    laylancia  5u947jx1-6          Ruel Robledo MD    refill  3t3-5402-1      /2016    Venkat



       6l4-1de984            



       1be13b            



                   



                   

 

 Mode COLMENARES.    laylancia  45667964-z          Ruel Robledo MD    refill  s1g-48f8-9      /2016    Venkat



       690-641fab            



       5b1c5e            



                   



                   

 

 Mode COLMENARES.    laylancia  4y7v125j-d          Ruel Robledo MD    refill  055-45e4-9      /2016    Venkat



       0ba-8w899v            



       f973a0            



                   



                   

 

 Mode COLMENARES.    laylancia  2h6a0th6-9          Ruel Robledo MD    refill  w32-8wru-8      /2016    Venkat



       047-fav581            



       93832z            



                   



                   

 

 Mode COLMENARES.    laylancia  ixg8sy77-9          Ruel Robledo MD    refill  v2b-190u-5      /2016    Venkat



       p91-ly3ksk            



       6c56ae            



                   



                   

 

 Mode A.    laylancia  xwqye668-a          Ruel Robledo MD    refill  4dd-42b0-9      /2016    Venkat



       k3n-i3388p            



       005f27            



                   



                   

 

 Mode A.    3 MTH FU  7642f86e-6          Ruel Robledo MD      6da-4992-a      /2016    Venkat



       1v2-6a50wx            



       1bl127            



                   



                   

 

 Mode A.    3 MTH FU  58l98b64-u          Ruel Robledo MD      o0v-6ckb-3      /2016    Venkat



       496-74d2cc            



       5a68a0            



                   



                   

 

 Mode A.    3 MTH FU  949eaf0f-3          Ruel Robledo MD      9fe-4861-9      /2016    Venkat



       0y6-5clji3            



       az177t            



                   



                   

 

 Mode A.    3 MTH FU  oer47606-o          Ruel Robledo MD      0x5-5638-8      /2016    Venkat



       0z8-647ar3            



       289a6b            



                   



                   

 

 Mode A.    3 MTH FU  kc3ef019-3          Ruel Robledo MD      0ad-49dc-a      /2016    Venkat



       f29-64061t            



       cc71a6            



                   



                   

 

 Mode A.    3 MTH FU  v15rfb10-4          Ruel Robledo MD      5ab-477a-9      /2016    Venkat



       5fe-0g488u            



       f637ab            



                   



                   

 

 Mode A.    3 MTH FU  46c86mi9-3          Ruel Robledo MD      95f-4c5e-8      /2016    Venkat



       s53-53epvm            



       k4183m            



                   



                   

 

 Mode A.    3 MTH FU  2a82cr1r-8          Ruel Robledo MD      3n6-2fa9-2      /2016    Venkat



       2n8-6vu0u2            



       9533a1            



                   



                   

 

 Mode A.    3 MTH FU  9a2d86d7-9          Ruel Robledo MD      799-4b64-b      /2016    Venkat



       2y6-38s8a3            



       8b42dd            



                   



                   

 

 Mode A.    3 MTH FU  2q1367e8-l          Ruel Robledo MD      104-490d-b      /2016    Venkat



       6s6-0g7v5a            



       1b498r            



                   



                   

 

 Mode A.    actemra  4z853wl6-w          Ruel Robledo MD      bb6-4b13-a      /2016    Venkat



       98e-6a98fc            



       7892a0            



                   



                   

 

 Mode A.    actemra  3z986m39-r          Ruel Robledo MD      eec-47bf-b      /2016    Venkat



       b9g-36l139            



       a7fa0d            



                   



                   

 

 Mode A.    actemra  e7927173-6          Ruel Robledo MD      eb7-4e64-a      /2016    Venkat



       4da-621211            



       91fffa            



                   



                   

 

 Mode A.    actemra  x3h69782-0          Ruel Robledo MD      2t6-1pyu-9      /2016    Venkat



       409-6c4af4            



       437d14            



                   



                   

 

 Mode A.    actemra  a1wt96h4-6          Ruel Robledo MD      bfe-44fc-b      /2016    Venkat



       607-9375e7            



       bbac99            



                   



                   

 

 Mode A.    actemra  f218670q-y          Ruel Robledo MD      ea6-4d34-b      /2016    Venkat



       007-4b0ac0            



       286e02            



                   



                   

 

 Mode A.    actemra  916w5ado-g          Ruel Robledo MD      1dc-4e2d-9      /2016    Venkat



       6g4-94kgj6            



       f5ecb4            



                   



                   

 

 Mode A.    actemra  5373ajc8-c          Ruel Robledo MD      81e-4699-8      /2016    Venkat



       u6m-48f831            



       63dc22            



                   



                   

 

 Mode A.    actemra  4t076q6a-8          Ruel Robldeo MD      i15-61f3-y      /2016    Venkat



       ce5-1a52ff            



       95276d            



                   



                   

 

 Mode A.    actemra  13t44519-4          Ruel Robledo MD      bed-4d6c-a      /2016    Venkat



       2fa-fe1c0d            



       5d1c88            



                   



                   

 

 Mode A.    actemra  97md8i3g-q          Ruel Robledo MD      0o0-6i47-5      /2016    Venkat



       bc4-e203c3            



       091cde            



                   



                   

 

 Mode A.    MRI/DEXA  069a288e-5          Ruel Robledo MD      ed8-4e9e-9      /2016    Venkat



       z98-964a0x            



       00658c            



                   



                   

 

 Mode A.    MRI/DEXA  70zjr5s4-3          Ruel Robledo MD      2d5-175f-6      /2016    Venkat



       l21-f7852f            



       f0c89f            



                   



                   

 

 Mode A.    MRI/DEXA  4929i65h-2          Ruel Robledo MD      186-4eeb-9      /2016    Venkat



       05f-2bf3d4            



       29de2c            



                   



                   

 

 Mode A.    MRI/DEXA  2mlq1285-0          Ruel Robledo MD      41d-4184-b      /2016    Venkat



       dd4-8adfa0            



       f3ff7c            



                   



                   

 

 Mode A.    MRI/DEXA  5gq0h7h7-0          Ruel Robledo MD      118-4cec-9      /2016    Venkat



       429-732eff            



       32c0b5            



                   



                   

 

 Mode A.    MRI/DEXA  u81w40y7-g          Ruel Robledo MD      733-49a2-a      /2016    Venkat



       61e-1d27c2            



       vim128            



                   



                   

 

 Mode A.    MRI/DEXA  492krn27-i          Ruel Robledo MD      i83-79yr-7      /2016    Venkat



       fc0-c06eca            



       vi991m            



                   



                   

 

 Mode A.    MRI/DEXA  u8u84x1f-9          Ruel Robledo MD      611-48c2-8      /2016    Venkat



       aaa-fa57c2            



       [id number]            



                   



                   

 

 Mode A.    MRI/DEXA  4jq515l6-g          Ruel Robledo MD      p4u-7617-u      /2016    Venkat



       170-lpz932            



       971ab3            



                   



                   

 

 Mode A.    3 MTH FU  q780n676-2          Ruel Robledo MD      fed-4d22-9      /2016    Venkat



       y01-1g3793            



       5afd07            



                   



                   

 

 Mode A.    3 MTH FU  p2f9153m-2          Ruel Robledo MD      6cb-4f99-9      /2016    Venkat



       414-22e5c8            



       63bd0c            



                   



                   

 

 Mode A.    3 MTH FU  6g203nx1-7          Ruel Robledo MD      7v4-76s7-6      /2016    Venkat



       ee4-441cf0            



       7f5d5c            



                   



                   

 

 Mode A.    3 MTH FU  5d2euiep-w          Ruel Robledo MD      d80-58e9-e      /2016    Venkat



       840-87o125            



       of8876            



                   



                   

 

 Mode A.    3 MTH FU  26v5v3k9-8          Ruel Robledo MD      i63-94cj-s      /2016    Venkat



       v2k-17oc9o            



       1eafa6            



                   



                   

 

 Mode A.    3 MTH FU  02ncj99x-2          Ruel Robledo MD      fb7-4c4a-b      /2016    Venkat



       s80-9365f4            



       81n228            



                   



                   

 

 Mode A.    3 MTH FU  5n07q331-0          Ruel Robledo MD      30c-46d4-9      /2016    Venkat



       743-0kp408            



       bdb2b6            



                   



                   

 

 Mode A.    3 MTH FU  r64f6318-q          Ruel Robledo MD      397-4dc3-a      /2016    Venkat



       n28-9vfy48            



       bca1ca            



                   



                   

 

 Mode A.    3 MTH FU  l09i54j4-9          Ruel Robledo MD      y21-2407-u      /2016    Venkat



       968-c57470            



       da2dfa            



                   



                   

 

 Mode A.    3 MTH FU  298z3t1c-2          Ruel Robledo MD      41e-45f8-8      /2016    Venkat



       373-14cc19            



       761494            



                   



                   

 

 Mode FRANCISCO.    3 MTH FU  5st55w55-n          Ruel Robledo MD      70f-4097-    Venkat



       7cc-b6b5ec            



       57u392            



                   



                   

 

 Mode FRANCISCO.    Actemra rx  8880baab-e          Ruel Robledo MD      21c-4bcb-      /2016    Venkat



       072-598768            



       8afcb8            



                   



                   

 

 Mode FRANCISCO.    Actemra rx  2xz0494c-t          Ruel Robledo MD      68d-43be-      /2016    Venkat



       4v2-4lo75y            



       2d60de            



                   



                   

 

 Mode FRANCISCO.    Actemra rx  69245697-a          Ruel Robledo MD      965-499b-      /2016    Venkat



       w1i-46983q            



       q3w791            



                   



                   

 

 Mode FRANCISCO.    Actemra rx  5e253058-4          Ruel Robledo MD      06b-4b70-9      /2016    Venkat



       q0a-9q9769            



       e89a0f            



                   



                   

 

 Mode FRANCISCO.    Actemra rx  1z89i419-6          Ruel Robledo MD      05d-4377-b      /2016    Venkat



       m9d-01fh84            



       3835a2            



                   



                   

 

 Mode FRANCISCO.    Actemra rx  x6034q2v-f          Ruel Robledo MD      1m3-13g7-i      /2016    Venkat



       p72-959i8s            



       2957c3            



                   



                   

 

 Mode FRANCISCO.    Actemra rx  vi5pk9je-3          Ruel Robledo MD      db4-4c01-9      /2016    Venkat



       92d-69b7c6            



       b98460            



                   



                   

 

 Mode FRANCISCO.    DEXA  2632645d-9      10/03  10/03    Ruel Robledo MD      0fd-41a6-      /2016    Venkat



       263-4a23e9            



       y0y565            



                   



                   

 

 Mode ANDRADE    DEXA  210r1iq6-e      10/03  10/03    Ruel Robledo MD      11a-403e-b      /2016    Venkat



       129-0e951m            



       b3f1f2            



                   



                   

 

 Mode A.    DEXA  52571j1c-8      10/03  10/03    Ruel Robledo MD      7j5-8ryd-1      /2016    Venkat



       fed-3e2be3            



       55f9e4            



                   



                   

 

 Mode A.    DEXA  8b99021a-0      10/03  10/03    Ruel Robledo MD      93a-4221-a      /2016    Venkat



       110-ecy462            



       61149s            



                   



                   

 

 Mode A.    DEXA  6xd63f65-0      10/03  10/03    Ruel Robledo MD      da2-4203-9      /2016    Venkat



       o15-c9s374            



       696b7b            



                   



                   

 

 Mode A.    DEXA  k434k8k8-j      10/03  10/03    Ruel Robledo MD      0o9-7002-5      /2016    Venkat



       6ec-3740a5            



       0b2ce2            



                   



                   

 

 Mode A.    3 [facility] FU  c665z3kf-5      10/11  10/11    Ruel Robledo MD      8ae-4cbc-9      /2016    Venkat



       1l2-8z8o69            



       58v552            



                   



                   

 

 Mode A.    3 MTH FU  4k6923x2-x      10/11  10/11    Ruel Robleod MD      48a-4858-b      /2016    Venkat



       bfe-190e00            



       dcafec            



                   



                   

 

 Mode A.    3 [facility] FU  81q61k0l-4      10/11  10/11    Ruel Robledo MD      4de-45d6-b      /2016    Venkat



       8h0-ej569i            



       40ce00            



                   



                   

 

 Mode A.    3 [facility] FU  z69p9422-9      10/11  10/11    Ruel Robledo MD      h1t-0j42-0      /2016    Venkat



       dec-6d07ca            



       rw571j            



                   



                   

 

 Mode A.    3 [facility] FU  508ejtb0-9      10/11  10/11    Ruel Robledo MD      df6-402c-b      /2016    Venkat



       578-pc6266            



       43s027            



                   



                   

 

 Mode A.    xelkwamez rx  mj31pwt4-5      10/27  10/27    Ruel Robledo MD      283-4d45-a      /2016    Venkat



       550-9e5991            



       ux981x            



                   



                   

 

 Mode A.    xelkwamez rx  c8017643-6      10/27  10/27    Ruel Robledo MD      ac2-445b-    Venkat



       547-9c7f7b            



       d5fe42            



                   



                   

 

 Mode LUPE    rei rx  p0lj38j1-b      10/27  10/27    Ruel Robledo MD      ac2-4b08-    Venkat



       3bc-4778fe            



       097ce2            



                   



                   

 

 Mode FRANCISCOJaimie minaya rx  96j6792k-6      10/27  10/27    Ruel Robledo MD      3w4-5w02-5    Venkat



       eb3-k75877            



       6668eb            



                   



                   

 

 Mode COLMENARES.    3 [facility] FU  q7ni288y-9          Ruel Robledo MD      bef-424e-    Venkat



       v68-kyvfw4            



       23ce01            



                   



                   

 

 Mode COLMENARES.    3 [facility] FU  1wn81180-u          Ruel Robledo MD      944-4be2-    Venkat



       72b-7l506s            



       4c8dfa            



                   



                   







Procedures







 Procedure  Code  Date  Perfomer  Comments  Source

## 2018-12-17 NOTE — XMS REPORT
Mode Robledo MD

 Created on:July 10, 2014



Patient:MARINO ALMODOVAR

Sex:Male

:1955

External Reference #:[id number]





Demographics







 Address  [address] [place] [address]

 

 Phone  [phone]

 

 Preferred Language  Unknown

 

 Marital Status  Unknown

 

 Shinto Affiliation  Unknown

 

 Race  Unknown

 

 Ethnic Group  Unknown









Author







 Organization  [employer]









Care Team Providers







 Name  Role  Phone

 

 Mode Robledo  Provider Role  Unavailable









Encounters







 Encounter  Location  Date

 

 Unknown  Mode Robledo MD  May 06, 2014

 

 Refill- Anthonyncia  Mode Robledo MD  May 29, 2014

 

 Refill-Anthonyncia  Mode Robledo MD  2014







Problems







 Problem Type  Condition  ICD-9 Code  Onset Dates  Condition Status

 

 Problem  Long-term (current) use of other  V58.69    Active



   medications - High Risk      

 

 Problem  Long-term (current) use of  V58.65    Active



   steroids      

 

 Problem  Rheumatoid arthritis  714.0    Active

 

 Problem  Hypertension  997.91    Active

 

 Assessment  Rheumatoid arthritis  714.0    Active







Medications







 Medication  Code System  Code  Instructions  Start Date  End Date  Status  
Dosage

 

 ORENCIA SQ  Unknown  0  125MG SUBCUTANEOUSLY      Active  125MG



       ONCE A WEEK        







Social History







 Social History Element  Qualifiers  Date Reported

 

 Illicit Drugs  .  none  May 06, 2014

 

 Tobacco Use:  .   Are you a:: current smoker  May 06, 2014

 

 Caffeine:  yes.  1-5  May 06, 2014

 

 Exercise:  yes.  walk  May 06, 2014

 

 Alcohol:  no.  May 06, 2014







Summary Purpose

eClinicalWorks Submission

## 2018-12-17 NOTE — XMS REPORT
Mode Robledo MD

 Created on:2016



Patient:MARINO ALMODOVAR

Sex:Male

:1955

External Reference #:[id number]





Demographics







 Address  [address] [place] [id number]

 

 Phone  [phone]

 

 Preferred Language  Unknown

 

 Marital Status  Unknown

 

 Bahai Affiliation  Unknown

 

 Race  Unknown

 

 Ethnic Group  Unknown









Author







 [facility]









Care Team Providers







 Name  Role  Phone

 

 Mode Robledo  Provider Role  Unavailable









Encounters







 Encounter  Location  Date

 

 MRI Bi Hands  Mode Robledo MD  2015

 

 3 MTH FU  Mode Robledo MD  2016

 

 actemra  Mode Robledo MD  2016

 

 orencia live Robledo MD  2016

 

 Actemra rx  Mode Robledo MD  Aug 02, 2016

 

 rx refill  Mode Robledo MD  2015

 

 DEXA  Mode Robledo MD  Oct 03, 2016

 

 MRI Bi Hands  Mode Robledo MD  2015

 

 MRI/DEXA  Mode Robledo MD  2016

 

 Refill- Sherine Robledo MD  2015

 

 3 MTH FU  Mode Robledo MD  2016

 

 1 mo f/u  Mode Robledo MD  2015

 

 3m f/u  Mode Robledo MD  2015

 

 3 mo f/u  Mode Robledo MD  2015

 

 3 MTH FU  Mode Robledo MD  Oct 11, 2016

 

 Orebuster Robledo MD  2015

 

 xeljanz rx  Mode Robledo MD  Oct 27, 2016

 

 Refill- Sherine Robledo MD  2015

 

 RX Request-- Sherine Robledo MD  2015

 

 lab order  Mode Robledo MD  2014

 

 Unknown  Mode Robledo MD  2014

 

 RX Request-- Sherine Robledo MD  2014

 

 Refill   hydrocodone  11/15  Mode Robledo MD  2014

 

 Unknown  Mode Robledo MD  May 06, 2014

 

 Refill- Sherine Robledo MD  May 29, 2014

 

 Refill-Sherine Robledo MD  2014

 

 1 mo f/u  Mode Robledo MD  May 12, 2015

 

 RX Request-- Sherine Robledo MD  May 06, 2015

 

 Refill  Mode Robledo MD  Dec 12, 2014

 

 F/U  Mode Robledo MD  Oct 15, 2014

 

 Unknown  Mode Robledo MD  Aug 06, 2015

 

 1 mo f/u  Mode Robledo MD  2015

 

 1 mo f/u  Mode Robledo MD  2015

 

 Unknown  Mode Robledo MD  2015

 

 MRI  Mode Robledo MD  May 14, 2015

 

 Refill- Sherine Robledo MD  2015

 

 refill  Mode Robledo MD  Aug 03, 2015

 

 Refill  Mode Robledo MD  Aug 03, 2015







Problems







 Problem Type  Condition  ICD-9 Code  Onset Dates  Condition Status

 

 Problem  Pain of left hand  M79.642    Active

 

 Problem  Rheumatoid arthritis of multiple  M06.09    Active



   sites without rheumatoid factor      

 

 Problem  Pain of right hand  M79.641    Active

 

 Problem  Cigarette nicotine dependence,  F17.210    Active



   uncomplicated      

 

 Problem  Other long term (current) drug  Z79.899    Active



   therapy      







Medications







 Medication  Code System  Code  Instructions  Start Date  End Date  Status  
Dosage

 

 [facility]  [address]  11 MG Orally Once  Oct 27,    Active  1 
tablet



     -30  a day        







Social History







 Social History Element  Qualifiers  Date Reported

 

 Illicit Drugs  .  none  Oct 11, 2016

 

 Alcohol Screening:  .   Points: 0  Oct 11, 2016

 

 Tobacco Use:  .   Are you a:: current smoker , How often do  Oct 11, 2016



   you smoke cigarettes?: every day, How many  



   cigarettes a day do you smoke?: 11-20, How soon  



   after you wake up do you smoke your first  



   cigarette?: 6-30 min, Are you interested in  



   quitting?: Not ready to quit  

 

 Caffeine:  yes.  1-5  Oct 11, 2016

 

 Exercise:  yes.  walk  Oct 11, 2016

 

 Alcohol:  no.  Oct 11, 2016







Summary Purpose

eClinicalWorks Submission

## 2018-12-17 NOTE — XMS REPORT
Mode Robledo MD

 Created on:2016



Patient:MARINO ALMODOVAR

Sex:Male

:1955

External Reference #:[id number]





Demographics







 Address  [address] [place] [id number]

 

 Phone  [phone]

 

 Preferred Language  Unknown

 

 Marital Status  Unknown

 

 Taoist Affiliation  Unknown

 

 Race  Unknown

 

 Ethnic Group  Unknown









Author







 [facility]









Care Team Providers







 Name  Role  Phone

 

 Christine Chin  Provider Role  Unavailable









Allergies, Adverse Reactions, Alerts







 Substance  Reaction  Event Type

 

 Lyrica  Info Not Available  Drug Allergy

 

 Humira  Info Not Available  Non Drug Allergy

 

 Enbrel  Info Not Available  Non Drug Allergy







Encounters







 Encounter  Location  Date

 

 MRI Bi Hands  Mode Robledo MD  2015

 

 3 MTH FU  Mode Robledo MD  2016

 

 actemra  Mode Robledo MD  2016

 

 deanne Robledo MD  2016

 

 rx refill  Mode Robledo MD  2015

 

 MRI Bi Hands  Mode Robledo MD  2015

 

 Refill- Deanne Robledo MD  2015

 

 1 mo f/u  Mode Robledo MD  2015

 

 3m f/u  Mode Robledo MD  2015

 

 3 mo f/u  Mode Robledo MD  2015

 

 Deanne Robledo MD  2015

 

 Refill- Deanne Robledo MD  2015

 

 RX Request-- Deanne Robledo MD  2015

 

 lab order  Mode Robledo MD  2014

 

 Unknown  Mode Robledo MD  2014

 

 RX Request-- Deanne Robledo MD  2014

 

 Refill   hydrocodone  11/15  Mode Robledo MD  2014

 

 Unknown  Mode Robledo MD  May 06, 2014

 

 Refill- Deanne Robledo MD  May 29, 2014

 

 Refill-Deanne Robledo MD  2014

 

 1 mo f/u  Mode Robledo MD  May 12, 2015

 

 RX Request-- Deanne Robledo MD  May 06, 2015

 

 Refill  Mode Robledo MD  Dec 12, 2014

 

 F/U  Mode Robledo MD  Oct 15, 2014

 

 Unknown  Mode Robledo MD  Aug 06, 2015

 

 1 mo f/u  Mode Robledo MD  2015

 

 1 mo f/u  Mode Robledo MD  2015

 

 Unknown  Mode Robledo MD  2015

 

 MRI  Mode Robledo MD  May 14, 2015

 

 Refill- Orencia  Mode Robledo MD  2015

 

 refill  Mode Robledo MD  Aug 03, 2015

 

 Refill  Mode Robledo MD  Aug 03, 2015







Problems







 Problem Type  Condition  ICD-9 Code  Onset Dates  Condition Status

 

 Assessment  Cigarette nicotine dependence,  F17.210    Active



   uncomplicated      

 

 Problem  Rheumatoid arthritis of multiple  714.0    Active



   sites without organ or system      



   involvement with positive      



   rheumatoid factor      

 

 Assessment  Rheumatoid arthritis of multiple  M06.09    Active



   sites without rheumatoid factor      

 

 Assessment  Encounter for long-term  Z79.899    Active



   (current) use of other high-risk      



   medications      

 

 Problem  Other long term (current) drug  Z79.899    Active



   therapy      

 

 Problem  Lumbago  724.2    Active

 

 Problem  Cigarette nicotine dependence,  F17.210    Active



   uncomplicated      

 

 Problem  Long-term (current) use of  V58.65    Active



   steroids      

 

 Problem  Hypertension  997.91    Active

 

 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

 

 Problem  Long-term (current) use of other  V58.69    Active



   medications - High Risk      







Medications







 Medication  Code  Code  Instructions  Start  End  Status  Dosage



   System      Date  Date    

 

 Inderal LA  [facility]  [address]-6  160 MG Orally      Active  1 capsule



     1  Once a day        

 

 Voltaren  [facility]  [address]-1  1 % Transdermal    May  Active  as



     1  PRN    03,    directed



               

 

 Nuvigil  [facility]  [address]-3  250 MG Orally      Active  1 tablet



     0  Once a day        in the



               morning

 

 ORENCIA SQ  Unknown  0  125MG    Inactive  125mg



       SUBCUTANEOUSLY    04,    



       ONCE A WEEK        

 

 Calcium  [facility]  [address]55[address]  otc Orally once      Active  1 tablet



       a day        with meals

 

 Hydrocodone-Ace  [facility]  [address]-3   MG Orally      Active  1 tablet



 taminophen    0  every 6 hrs        as needed

 

 Methotrexate  [facility]  [address]-5  25 MG/ML SQ      Active  1 ml



 Sodium    0  injection Once a        



       week        

 

 Tricor  [facility]  [address]-8  145 MG Orally      Active  1 tablet



     0  Once a day        

 

 Nexium  [facility]  [address]-9  40 MG Orally prn      Active  1 capsule



     1          

 

 Trazodone HCl  [facility]  [address]-0  150 MG Orally at      Active  1 tablet



     1  night        

 

 Folic Acid  [facility]  [address]-2  1 MG Orally Once    Aug  Active  1 tablet



     9  a day    2016    

 

 Actemra SQ  [facility]  5862589566  162mg/0.9 mL  Feb 04,    Active  1



       subcutaneous  2016      injection



       every other week        

 

 Multivitamins  [facility]  [address]-0   Orally once a      Active  1 tablet



     0  day        







Social History







 Social History Element  Qualifiers  Date Reported

 

 Illicit Drugs  .  none  2016

 

 Alcohol Screening:  .   Points: 0  2016

 

 Tobacco Use:  .   Are you a:: current smoker , How often do  2016



   you smoke cigarettes?: every day, How many  



   cigarettes a day do you smoke?: 11-20, How soon  



   after you wake up do you smoke your first  



   cigarette?: 6-30 min, Are you interested in  



   quitting?: Not ready to quit  

 

 Caffeine:  yes.  1-5  2016

 

 Exercise:  yes.  walk  2016

 

 Alcohol:  no.  2016







Vital Signs







 Date/Time:  2016

 

 Weight  206 lbs

 

 Height  72 in

 

 Temperature  97.5 F

 

 Cardiac Monitoring Heart Rate  64 /min

 

 Blood Pressure Diastolic  90 mm Hg

 

 Blood Pressure Systolic  130 mm Hg







Immunizations







 Vaccine  Administration Date

 

 Actem







Summary Purpose

eClinicalWorks Submission

## 2018-12-17 NOTE — XMS REPORT
Mode Robledo MD

 Created on:2016



Patient:MARINO ALMODOVAR

Sex:Male

:1955

External Reference #:[id number]





Demographics







 Address  [address] [place] [id number]

 

 Phone  [phone]

 

 Preferred Language  Unknown

 

 Marital Status  Unknown

 

 Anglican Affiliation  Unknown

 

 Race  Unknown

 

 Ethnic Group  Unknown









Author







 [facility]  [facility]









Care Team Providers







 Name  Role  Phone

 

 Christine Chin  Provider Role  Unavailable









Allergies, Adverse Reactions, Alerts







 Substance  Reaction  Event Type

 

 Lyrica  Info Not Available  Drug Allergy

 

 Orencia  Info Not Available  Non Drug Allergy

 

 Humira  Info Not Available  Non Drug Allergy

 

 Enbrel  Info Not Available  Non Drug Allergy







Encounters







 Encounter  Location  Date

 

 [facility]  Mode Robledo MD  2015

 

 3 MTH CHRISTINA Robledo MD  2016

 

 actinga Robledo MD  2016

 

 deanne Robledo MD  2016

 

 Actemra rx  Mode Robledo MD  Aug 02, 2016

 

 rx refill  Mode Robledo MD  2015

 

 DEXA  Mode Robledo MD  Oct 03, 2016

 

 [facility]  Mode Robledo MD  2015

 

 MRI/DEXA  Mode Robledo MD  2016

 

 Refill- Deanne Robledo MD  2015

 

 3 MTH CHRISTINA Robledo MD  2016

 

 1 mo f/u  Mode Robledo MD  2015

 

 3 MTH CHRISTINA Robledo MD  2016

 

 3m f/u  Mode Robledo MD  2015

 

 3 mo f/u  Mode Robledo MD  2015

 

 3 MTH CHRISTINA Robledo MD  Oct 11, 2016

 

 Deanne Robledo MD  2015

 

 xeljanz rx  Mode Robledo MD  Oct 27, 2016

 

 Refill- Deanne Robledo MD  2015

 

 RX Request-- Deanne Robledo MD  2015

 

 lab order  Mode Robledo MD  2014

 

 Unknown  Mode Robledo MD  2014

 

 RX Request-- Deanne Robledo MD  2014

 

 Refill   hydrocodone  11/15  Mode Robledo MD  2014

 

 Unknown  Mode Robledo MD  May 06, 2014

 

 Refill- Deanne Robledo MD  May 29, 2014

 

 Refill-Deanne Robledo MD  2014

 

 1 mo f/u  Mode Robledo MD  May 12, 2015

 

 RX Request-- Deanne Robledo MD  May 06, 2015

 

 Refill  Mode Robledo MD  Dec 12, 2014

 

 F/NEGAR Robledo MD  Oct 15, 2014

 

 Unknown  Mode Robledo MD  Aug 06, 2015

 

 1 mo f/u  Mode Robledo MD  2015

 

 1 mo f/u  Mode Robledo MD  2015

 

 Unknown  Mode Robledo MD  2015

 

 MRI  Mode Robledo MD  May 14, 2015

 

 Refill- Deanne Robledo MD  2015

 

 refill  Mode Robledo MD  Aug 03, 2015

 

 Refill  Mode Robledo MD  Aug 03, 2015







Problems







 Problem Type  Condition  ICD-9 Code  Onset Dates  Condition Status

 

 Problem  Cigarette nicotine dependence,  F17.210    Active



   uncomplicated      

 

 Problem  Other long term (current) drug  Z79.899    Active



   therapy      

 

 Problem  Rheumatoid arthritis of multiple  M06.09    Active



   sites without rheumatoid factor      

 

 Assessment  Cigarette nicotine dependence,  F17.210    Active



   uncomplicated      

 

 Assessment  Rheumatoid arthritis of multiple  M06.09    Active



   sites without rheumatoid factor      

 

 Assessment  Other long term (current) drug  Z79.899    Active



   therapy      







Medications







 Medication  Code System  Code  Instructions  Start  End  Status  Dosage



         Date  Date    

 

 Folic Acid  [facility]  [address]-44  1 MG Orally      Active  1 tablet



       Once a day        once a day



               orally 90



               days

 

 Nuvigil  [facility]  [address]-27  250 MG Orally      Active  1 tablet in



       Once a day        the morning

 

 Trazodone HCl  [facility]  [address]-18  150 MG Orally      Active  1 tablet



       at night        

 

 Calcium  [facility]  95645-58830  otc Orally      Active  1 tablet



       once a day        with meals

 

 Nexium  [facility]  [address]-55  40 MG Orally      Active  1 capsule



       prn        

 

 Voltaren  [facility]  [address]-97  1 %      Active  as directed



       Transdermal        



       PRN        

 

 Actemra SQ  [facility]  8804121446  162mg/0.9 mL  Feb 04,  Oct  Active  1 
injection



       subcutaneous    19,    



       once a week        

 

 Multivitamins  [facility]  [address]-14   Orally once a      Active  1 tablet



       day        

 

 Hydrocodone-Acet  [facility]  [address]-55   MG      Active  1 tablet as



 aminophen      Orally every 6        needed



       hrs        

 

 Inderal LA  [facility]  [address]-74  160 MG Orally      Active  1 capsule



       Once a day        

 

 Tricor  [facility]  [address]-77  145 MG Orally      Active  1 tablet



       Once a day        

 

 Methotrexate  [facility]  [address]-10  25 MG/ML SQ      Active  1 ml



 Sodium      injection Once        



       a week        







Social History







 Social History Element  Qualifiers  Date Reported

 

 Illicit Drugs  .  none  Oct 11, 2016

 

 Alcohol Screening:  .   Points: 0  Oct 11, 2016

 

 Tobacco Use:  .   Are you a:: current smoker , How often do  Oct 11, 2016



   you smoke cigarettes?: every day, How many  



   cigarettes a day do you smoke?: 11-20, How soon  



   after you wake up do you smoke your first  



   cigarette?: 6-30 min, Are you interested in  



   quitting?: Not ready to quit  

 

 Caffeine:  yes.  1-5  Oct 11, 2016

 

 Exercise:  yes.  walk  Oct 11, 2016

 

 Alcohol:  no.  Oct 11, 2016







Vital Signs







 Date/Time:  2016

 

 Weight  203 lbs

 

 Height  73 in

 

 Temperature  97.0 F

 

 Cardiac Monitoring Heart Rate  72 /min







Summary Purpose

eClinicalWorks Submission

## 2018-12-17 NOTE — XMS REPORT
Mode Robledo MD

 Created on:2017



Patient:MARINO ALMODOVAR

Sex:Male

:1955

External Reference #:[id number]





Demographics







 Address  [address] [place] [id number]

 

 Phone  [phone]

 

 Preferred Language  Unknown

 

 Marital Status  Unknown

 

 Taoism Affiliation  Unknown

 

 Race  Unknown

 

 Ethnic Group  Unknown









Author







 Organization  [employer]









Care Team Providers







 Name  Role  Phone

 

 Christine Chin  Provider Role  Unavailable









Allergies

No Known Allergies



Problems







 Problem Type  Condition  Code  Onset Dates  Condition Status

 

 Assessment  Rheumatoid arthritis of multiple  M06.09    Active



   sites without rheumatoid factor      

 

 Problem  Osteopenia  M85.80    Active

 

 Problem  Pain of right hand  M79.641    Active

 

 Problem  Other chronic pain  G89.29    Active

 

 Problem  Cigarette nicotine dependence,  F17.210    Active



   uncomplicated      

 

 Problem  Other long term (current) drug  Z79.899    Active



   therapy      

 

 Problem  Pain of left hand  M79.642    Active

 

 Problem  Rheumatoid arthritis of multiple  M06.09    Active



   sites without rheumatoid factor      







Medications







 Medication  Code System  Code  Instructions  Start Date  End Date  Status  
Dosage

 

 Xeljanz XR  [facility]  75065607815  11 MG Orally Once      Active  1 tablet



       a day        







Results

No Known Results



Summary Purpose

eClinicalWorks Submission

## 2018-12-17 NOTE — XMS REPORT
Mode Robledo MD

 Created on:2017



Patient:MARINO ALMODOVAR

Sex:Male

:1955

External Reference #:[id number]





Demographics







 Address  [address] [place] [id number]

 

 Phone  [phone]

 

 Preferred Language  Unknown

 

 Marital Status  Unknown

 

 Episcopal Affiliation  Unknown

 

 Race  Unknown

 

 Ethnic Group  Unknown









Author







 [facility]









Care Team Providers







 Name  Role  Phone

 

 Christine Chin  Provider Role  Unavailable









Allergies, Adverse Reactions, Alerts







 Substance  Reaction  Event Type

 

 Lyrica  Info Not Available  Drug Allergy

 

 Orencia  Info Not Available  Non Drug Allergy

 

 Humira  Info Not Available  Non Drug Allergy

 

 Enbrel  Info Not Available  Non Drug Allergy







Encounters







 Encounter  Location  Date

 

 [facility]  Mode Robledo MD  2015

 

 3 MTH CHRISTINA Robledo MD  2016

 

 actinga Robledo MD  2016

 

 deanne Robledo MD  2016

 

 Actemra rx  Mode Robledo MD  Aug 02, 2016

 

 rx refill  Mode Robledo MD  2015

 

 DEXA  Mode Robledo MD  Oct 03, 2016

 

 [facility]  Mode Robledo MD  2015

 

 MRI/DEXA  Mode Robledo MD  2016

 

 Refill- Deanne Robledo MD  2015

 

 3 MTH CHRISTINA Robledo MD  2016

 

 1 mo f/u  Mode Robledo MD  2015

 

 3 MTH CHRISTINA Robledo MD  2016

 

 3m f/u  Mode Robledo MD  2015

 

 3 mo f/u  Mode Robledo MD  2015

 

 3 MTH CHRISTINA Robledo MD  Oct 11, 2016

 

 Deanne Robledo MD  2015

 

 xeljanz rx  Mode Robledo MD  Oct 27, 2016

 

 Refill- Deanne Robledo MD  2015

 

 RX Request-- Deanne Robledo MD  2015

 

 lab order  Mode Robledo MD  2014

 

 Unknown  Mode Robledo MD  2014

 

 RX Request-- Deanne Robledo MD  2014

 

 Refill   hydrocodone  11/15  Mode Robledo MD  2014

 

 Unknown  Mode Robledo MD  May 06, 2014

 

 Refill- Deanne Robledo MD  May 29, 2014

 

 Refill-Deanne Robledo MD  2014

 

 1 mo f/u  Mode Robledo MD  May 12, 2015

 

 RX Request-- Deanne Robledo MD  May 06, 2015

 

 Refill  Mode Robledo MD  Dec 12, 2014

 

 3 MTH FU  Mode Robledo MD  2017

 

 F/U  Mode Robledo MD  Oct 15, 2014

 

 Unknown  Mode Robledo MD  Aug 06, 2015

 

 1 mo f/u  Mode Robledo MD  2015

 

 1 mo f/u  Mode Robledo MD  2015

 

 Unknown  Mode Robledo MD  2015

 

 MRI  Mode Robledo MD  May 14, 2015

 

 Refill- Deanne Robledo MD  2015

 

 refill  Mode Robledo MD  Aug 03, 2015

 

 Refill  Mode Robledo MD  Aug 03, 2015







Problems







 Problem Type  Condition  ICD-9 Code  Onset Dates  Condition Status

 

 Assessment  Osteopenia  M85.80    Active

 

 Assessment  Other long term (current) drug  Z79.899    Active



   therapy      

 

 Assessment  Chronic prescription opiate use  Z79.891    Active

 

 Problem  Pain of right hand  M79.641    Active

 

 Problem  Pain of left hand  M79.642    Active

 

 Problem  Osteopenia  M85.80    Active

 

 Problem  Other long term (current) drug  Z79.899    Active



   therapy      

 

 Assessment  Rheumatoid arthritis of multiple  M06.09    Active



   sites without rheumatoid factor      

 

 Problem  Rheumatoid arthritis of multiple  M06.09    Active



   sites without rheumatoid factor      

 

 Problem  Cigarette nicotine dependence,  F17.210    Active



   uncomplicated      







Medications







 Medication  Code System  Code  Instructions  Start  End Date  Status  Dosage



         Date      

 

 Trazodone HCl  [facility]  00378-3  150 MG Orally      Active  1 tablet



     473-01  at night        

 

 Xeljanz XR  [facility]  94345-5  11 MG Orally  Oct 27,    Active  1 tablet



     501-30  Once a day        

 

 Methotrexate  [facility]  88331-0  25 MG/ML      Active  1 ml



 Sodium    350-10  Injection Once        



       a week        

 

 Voltaren  [facility]  61178-9  1 % Transdermal      Active  as directed



     427-01  PRN        

 

 Multivitamins  [facility]  87352-6   Orally once a      Active  1 tablet



     046-10  day        

 

 Tricor  [facility]  50825-9  145 MG Orally      Active  1 tablet



     123-90  Once a day        

 

 Nexium  [facility]  83449-6  40 MG Orally      Active  1 capsule



     040-31  prn        

 

 Folic Acid  [facility]  01976-1  1 MG Orally    April  Active  1 tablet



     507-19  Once a day    2017    once a day



               orally 90



               days

 

 Propranolol HCl  [facility]  15659-6  10 MG Orally      Active  1 tablet



     182-01  Once a day        

 

 Hydrocodone-Aceta  [facility]  49537-6   MG      Active  1 tablet as



 minophen    116-30  Orally every 6        needed



       hrs        

 

 Nuvigil  [facility]  78205-9  250 MG Orally      Active  1 tablet in



     394-30  Once a day        the morning

 

 Calcium  [facility]  91837-3  otc Orally once      Active  1 tablet



     5034  a day        with meals

 

 Inderal LA  [facility]  03015-3  160 MG Orally      Active  1 capsule



     479-81  Once a day        







Social History







 Social History Element  Qualifiers  Date Reported

 

 Illicit Drugs  .  none  2017

 

 Alcohol Screening:  .   Points: 0  2017

 

 Tobacco Use:  .   Are you a:: current smoker , How often do  2017



   you smoke cigarettes?: every day, How many  



   cigarettes a day do you smoke?: 11-20, How soon  



   after you wake up do you smoke your first  



   cigarette?: 6-30 min, Are you interested in  



   quitting?: Not ready to quit  

 

 Caffeine:  yes.  1-5  2017

 

 Exercise:  yes.  walk  2017

 

 Alcohol:  no.  2017







Vital Signs







 Date/Time:  2017

 

 Weight  204.3 lbs

 

 Height  73 in

 

 Temperature  97.4 F

 

 Cardiac Monitoring Heart Rate  64 /min

 

 Blood Pressure Diastolic  90 mm Hg

 

 Blood Pressure Systolic  142 mm Hg







Summary Purpose

eClinicalWorks Submission

## 2018-12-17 NOTE — XMS REPORT
Mode Robledo MD

 Created on:March 3, 2016



Patient:MARINO ALMODOVAR

Sex:Male

:1955

External Reference #:[id number]





Demographics







 Address  [address] [place] [id number]

 

 Phone  [phone]

 

 Preferred Language  Unknown

 

 Marital Status  Unknown

 

 Pentecostal Affiliation  Unknown

 

 Race  Unknown

 

 Ethnic Group  Unknown









Author







 [facility]









Care Team Providers







 Name  Role  Phone

 

 Christina Rae  Provider Role  Unavailable









Encounters







 Encounter  Location  Date

 

 [facility]  Mode Robledo MD  2015

 

 actemra  Mode Robledo MD  2016

 

 orencjanine Robledo MD  2016

 

 rx refill  Mode Robledo MD  2015

 

 [facility]  Mode Robledo MD  2015

 

 Refill- Sherine Robledo MD  2015

 

 1 mo f/u  Mode Robledo MD  2015

 

 3m f/u  Moed Robledo MD  2015

 

 3 mo f/u  Mode Robledo MD  2015

 

 Anthonyncjanine Robledo MD  2015

 

 Refill- Sherine Robledo MD  2015

 

 RX Request-- Sherine Robledo MD  2015

 

 lab order  Mode Robledo MD  2014

 

 Unknown  Mode Robledo MD  2014

 

 RX Request-- Sherine Robledo MD  2014

 

 Refill   hydrocodone  11/15  Mode Robledo MD  2014

 

 Unknown  Mode Robledo MD  May 06, 2014

 

 Refill- Sherine Robledo MD  May 29, 2014

 

 Refill-Sherine Robledo MD  2014

 

 1 mo f/u  Mode Robledo MD  May 12, 2015

 

 RX Request-- Sherine Robledo MD  May 06, 2015

 

 Nellieill  Mode Robledo MD  Dec 12, 2014

 

 F/NEGAR Robledo MD  Oct 15, 2014

 

 Unknown  Mode Robledo MD  Aug 06, 2015

 

 1 mo f/u  Mode Robledo MD  2015

 

 1 mo f/u  Mode Robledo MD  2015

 

 Unknown  Mode Robledo MD  2015

 

 MRI  Mode Robledo MD  May 14, 2015

 

 Refill- Orencia  Mode Robledo MD  2015

 

 refill  Mode Robledo MD  Aug 03, 2015

 

 Refill  Mode Robledo MD  Aug 03, 2015







Problems







 Problem Type  Condition  ICD-9 Code  Onset Dates  Condition Status

 

 Problem  Rheumatoid arthritis of multiple  714.0    Active



   sites without organ or system      



   involvement with positive      



   rheumatoid factor      

 

 Assessment  Rheumatoid arthritis of multiple  M06.09    Active



   sites without rheumatoid factor      

 

 Problem  Other long term (current) drug  Z79.899    Active



   therapy      

 

 Problem  Lumbago  724.2    Active

 

 Problem  Cigarette nicotine dependence,  F17.210    Active



   uncomplicated      

 

 Problem  Long-term (current) use of  V58.65    Active



   steroids      

 

 Problem  Hypertension  997.91    Active

 

 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

 

 Problem  Long-term (current) use of other  V58.69    Active



   medications - High Risk      







Medications







 Medication  Code System  Code  Instructions  Start  End  Status  Dosage



         Date  Date    

 

 Actemra SQ  [facility]  5399134183  162mg/0.9 mL  ,    Active  1 injection



       subcutaneous  2016      



       every other week        







Social History







 Social History Element  Qualifiers  Date Reported

 

 Illicit Drugs  .  none  2016

 

 Alcohol Screening:  .   Points: 0  2016

 

 Tobacco Use:  .   Are you a:: current smoker , How often do  2016



   you smoke cigarettes?: every day, How many  



   cigarettes a day do you smoke?: 11-20, How soon  



   after you wake up do you smoke your first  



   cigarette?: 6-30 min, Are you interested in  



   quitting?: Not ready to quit  

 

 Caffeine:  yes.  1-5  2016

 

 Exercise:  yes.  walk  2016

 

 Alcohol:  no.  2016







Summary Purpose

eClinicalWorks Submission

## 2018-12-17 NOTE — XMS REPORT
Mode Robledo MD

 Created on:2018



Patient:MARINO ALMODOVAR

Sex:Male

:1955

External Reference #:[id number]





Demographics







 Address  [address] [place] [id number]

 

 Phone  [phone]

 

 Preferred Language  Unknown

 

 Marital Status  Unknown

 

 Sikh Affiliation  Unknown

 

 Race  Unknown

 

 Ethnic Group  Unknown









Author







 Organization  [employer]









Care Team Providers







 Name  Role  Phone

 

 Christine Chin  Provider Role  Unavailable









Allergies, Adverse Reactions, Alerts







 Substance  Reaction  Event Type

 

 Lyrica  Info Not Available  Drug Allergy

 

 Orencia  Info Not Available  Non Drug Allergy

 

 Humira  Info Not Available  Non Drug Allergy

 

 Enbrel  Info Not Available  Non Drug Allergy







Problems







 Problem Type  Condition  Code  Onset Dates  Condition Status

 

 Assessment  Rheumatoid arthritis of multiple  M06.09    Active



   sites without rheumatoid factor      

 

 Assessment  Other long term (current) drug  Z79.899    Active



   therapy      

 

 Problem  Osteopenia  M85.80    Active

 

 Problem  Pain of right hand  M79.641    Active

 

 Problem  Other chronic pain  G89.29    Active

 

 Problem  Cigarette nicotine dependence,  F17.210    Active



   uncomplicated      

 

 Problem  Other long term (current) drug  Z79.899    Active



   therapy      

 

 Problem  Pain of left hand  M79.642    Active

 

 Problem  Rheumatoid arthritis of multiple  M06.09    Active



   sites without rheumatoid factor      







Medications







 Medication  Code  Code  Instructions  Start  End  Status  Dosage



   System      Date  Date    

 

 Multivitamins  [facility]  72864959510   Orally once a      Active  1 tablet



       day        

 

 Tricor  [facility]  61343081881  145 MG Orally      Active  1 tablet



       Once a day        

 

 Inderal LA  ND  61150195301  160 MG Orally      Active  1 capsule



       Once a day        

 

 Calcium  [facility]  08171661862  otc Orally once      Active  1 tablet



       a day        with meals

 

 Nuvigil  [facility]  05461489922  250 MG Orally      Active  1 tablet in



       Once a day        the morning

 

 Voltaren  [facility]  94678387850  1 % Transdermal      Active  as directed



       PRN        

 

 Xeljanz XR  [facility]  25963099959  11 MG Orally      Active  1 tablet



       Once a day        

 

 Hydrocodone-Acet  ND  89340726871   MG      Active  1 tab



 aminophen      Orally BID to        



       QID as needed        

 

 Medrol Dose Norris  [facility]  86219238906  4mg Orally once  Dec 07,    Active  as 
directed



       a day        

 

 Trazodone HCl  [facility]  83945884819  150 MG Orally      Active  1 tablet



       at night        

 

 Propranolol HCl  [facility]  10512025933  10 MG Orally      Active  1 tablet



       Once a day        







Vital Signs







 Date/Time:  2018

 

 BMI  29.30 Index

 

 Weight  222.1 lbs

 

 Height  73 in

 

 Temperature  96.7 F

 

 Cardiac Monitoring Heart Rate  78 /min

 

 Blood Pressure Diastolic  98 mm Hg

 

 Blood Pressure Systolic  144 mm Hg







Results







 Name  Result  Date  Reference Range  Unit  Abnormality Flag

 

 COMPREHENSIVE          



 METABOLIC PANEL          



 W/EGFR          

 

 ----CALCIUM  9.4  2018  8.6-10.3  mg/dL  N

 

 ----CARBON DIOXIDE  28  2018  20-31  mmol/L  N

 

 ----ALT  11  2018  9-46  U/L  N

 

 ----CREATININE  1.08  2018  0.70-1.25  mg/dL  N

 

 ----AST  16  2018  10-35  U/L  N

 

 ----eGFR NON-AFR.  74  15401414  > OR=60  mL/min/1.7  N



 AMERICAN        3m2  

 

 ----ALKALINE  31  2018    U/L  L



 PHOSPHATASE          

 

 ----eGFR   85  99760154  > OR=60  mL/min/1.7  N



 AMERICAN        3m2  

 

 ----BILIRUBIN,  0.4  2018  0.2-1.2  mg/dL  N



 TOTAL          

 

 ----BUN/CREATININE  NOT APPLICABLE  2018  6-22  (calc)  



 RATIO          

 

 ----ALBUMIN/GLOBULI  1.5  20183914  1.0-2.5  (calc)  N



 N RATIO          

 

 ----SODIUM  140  2018  135-146  mmol/L  N

 

 ----GLOBULIN  2.7  98888690  1.9-3.7  g/dL  N



         (calc)  

 

 ----POTASSIUM  4.7  76051219  3.5-5.3  mmol/L  N

 

 ----GLUCOSE  112  2018    mg/dL  N

 

 ----CHLORIDE  107  2018    mmol/L  N

 

 ----ALBUMIN  4.0  22254099  3.6-5.1  g/dL  N

 

 ----UREA NITROGEN  23  2018  7-25  mg/dL  N



 (BUN)          

 

 ----PROTEIN, TOTAL  6.7  83999255  6.1-8.1  g/dL  N

 

 SED RATE BY          



 MODIFIED WESTERGREN          

 

 ----SED RATE BY  1  2018  < OR=20  mm/h  N



 MODIFIED WESTERGREN          

 

 C-REACTIVE PROTEIN          

 

 ----C-REACTIVE  1.5  2018  <8.0  mg/L  N



 PROTEIN          

 

 CBC (INCLUDES          



 DIFF/PLT)          

 

 ----MCHC  34.1  15850319  32.0-36.0  g/dL  N

 

 ----MCH  30.0  06595445  27.0-33.0  pg  N

 

 ----PLATELET COUNT  184  29600627  140-400  Thousand/u  N



         L  

 

 ----RDW  13.7  85587064  11.0-15.0  %  N

 

 ----BASOPHILS  0.5  58977407    %  N

 

 ----ABSOLUTE  3371  03388033  9359-2670  cells/uL  N



 NEUTROPHILS          

 

 ----ABSOLUTE  1529  55236364  850-3900  cells/uL  N



 LYMPHOCYTES          

 

 ----MPV  11.6  96888623  7.5-12.5  fL  N

 

 ----ABSOLUTE  28  36360531  0-200  cells/uL  N



 BASOPHILS          

 

 ----HEMATOCRIT  41.0  99207917  38.5-50.0  %  N

 

 ----NEUTROPHILS  60.2  84935527    %  N

 

 ----MCV  87.8  35913471  80.0-100.0  fL  N

 

 ----RED BLOOD CELL  4.67  55307492  4.20-5.80  Million/uL  N



 COUNT          

 

 ----ABSOLUTE  521  49763084  200-950  cells/uL  N



 MONOCYTES          

 

 ----ABSOLUTE  151  51095296    cells/uL  N



 EOSINOPHILS          

 

 ----HEMOGLOBIN  14.0  86179542  13.2-17.1  g/dL  N

 

 ----EOSINOPHILS  2.7  72792765    %  N

 

 ----WHITE BLOOD  5.6  32615677  3.8-10.8  Thousand/u  N



 CELL COUNT        L  

 

 ----LYMPHOCYTES  27.3  92786671    %  N

 

 ----MONOCYTES  9.3  91302805    %  N







Summary Purpose

eClinicalWorks Submission

## 2018-12-18 PROCEDURE — 0S9C3ZX DRAINAGE OF RIGHT KNEE JOINT, PERCUTANEOUS APPROACH, DIAGNOSTIC: ICD-10-PCS

## 2018-12-18 NOTE — RAD REPORT
EXAM DESCRIPTION:  RAD - Knee Right 3 View - 12/17/2018 10:03 pm

 

CLINICAL HISTORY:  Pain;Swelling

Right knee pain and swelling

 

COMPARISON:  No comparisons

 

FINDINGS:  A small moderate suprapatellar joint effusion is present. Chondrocalcinosis of the menisci
 is seen. Prominent osteophyte inferior patella. No fracture or dislocation. No aggressive marrow pat
tern.

## 2018-12-18 NOTE — EDPHYS
Physician Documentation                                                                           

 [facility]                                                                

Name: Apolinar Drummond                                                                                

Age: 63 yrs                                                                                       

Sex: Male                                                                                         

: 1955                                                                                   

MRN: [id number]                                                                                   

Arrival Date: 2018                                                                          

Time: 20:52                                                                                       

Account#: [id number]                                                                            

Bed 25                                                                                            

Private MD: DEDRA Tomlinson C                                                                            

ED Physician Tom Cee                                                                         

HPI:                                                                                              

                                                                                             

21:53 This 63 yrs old  Male presents to ER via Wheelchair with complaints of Knee    kb  

      Pain.                                                                                       

21:54 The patient presents with decreased range of motion, pain, swelling. The complaints     kb  

      affect the right knee. Context: The problem was sustained at home, resulted from an         

      unknown cause, the patient can partially bear weight, must have assistance. Onset: The      

      symptoms/episode began/occurred yesterday. Modifying factors: The symptoms are              

      alleviated by nothing. the symptoms are aggravated by movement, weight bearing, bending     

      knee. Associated signs and symptoms: Pertinent positives: fever, swelling, Pertinent        

      negatives calf tenderness, nausea, numbness, rash, tingling, vomiting, warmth,              

      weakness. Treatment prior to arrival includes: no previous treatment. Severity of           

      symptoms: At their worst the symptoms were moderate, severe, in the emergency               

      department the symptoms are unchanged. The patient has not experienced similar symptoms     

      in the past. The patient has not recently seen a physician. Pt reports he took a nap        

      yesterday and when he woke up he had knee pain and swelling. Denies injury or trauma.       

      Pt unable to bear full weight, needs assist to ambulate due to pain. No redness noted. .    

                                                                                                  

Historical:                                                                                       

- Allergies:                                                                                      

20:55 No Known Allergies;                                                                     lp1 

- Home Meds:                                                                                      

20:55 Unable to obtain [Active];                                                              lp1 

- PMHx:                                                                                           

20:55 Rheumatoid Arthritis; Hyperlipidemia;                                                   lp1 

- PSHx:                                                                                           

20:55 knee replacement; neck surgery; carpel tunnel repair;                                   lp1 

                                                                                                  

- Immunization history:: Adult Immunizations up to date.                                          

- Social history:: Smoking status: Patient uses tobacco products, smokes one pack                 

  cigarettes per day.                                                                             

- Ebola Screening: : No symptoms or risks identified at this time.                                

                                                                                                  

                                                                                                  

ROS:                                                                                              

21:53 Cardiovascular: Negative for chest pain, palpitations, and edema, Respiratory: Negative kb  

      for shortness of breath, cough, wheezing, and pleuritic chest pain, Abdomen/GI:             

      Negative for abdominal pain, nausea, vomiting, diarrhea, and constipation, Back:            

      Negative for injury and pain, Skin: Negative for injury, rash, and discoloration,           

      Neuro: Negative for headache, weakness, numbness, tingling, and seizure.                    

21:53 Constitutional: Positive for chills, Negative for body aches, fatigue, fever, malaise,      

      poor PO intake, weight loss.                                                                

21:53 MS/extremity: Positive for decreased range of motion, pain, swelling, of the right knee.    

                                                                                                  

Exam:                                                                                             

21:52 Constitutional:  This is a well developed, well nourished patient who is awake, alert,  kb  

      and in no acute distress. Head/Face:  Normocephalic, atraumatic. ENT:  Nares patent. No     

      nasal discharge, no septal abnormalities noted.  Tympanic membranes are normal and          

      external auditory canals are clear.  Oropharynx with no redness, swelling, or masses,       

      exudates, or evidence of obstruction, uvula midline.  Mucous membranes moist. Neck:         

      Trachea midline, no thyromegaly or masses palpated, and no cervical lymphadenopathy.        

      Supple, full range of motion without nuchal rigidity, or vertebral point tenderness.        

      No Meningismus. Chest/axilla:  Normal chest wall appearance and motion.  Nontender with     

      no deformity.  No lesions are appreciated. Cardiovascular:  Regular rate and rhythm         

      with a normal S1 and S2.  No gallops, murmurs, or rubs.  Normal PMI, no JVD.  No pulse      

      deficits. Respiratory:  Lungs have equal breath sounds bilaterally, clear to                

      auscultation and percussion.  No rales, rhonchi or wheezes noted.  No increased work of     

      breathing, no retractions or nasal flaring. Abdomen/GI:  Soft, non-tender, with normal      

      bowel sounds.  No distension or tympany.  No guarding or rebound.  No evidence of           

      tenderness throughout. Skin:  Warm, dry with normal turgor.  Normal color with no           

      rashes, no lesions, and no evidence of cellulitis. Neuro:  Awake and alert, GCS 15,         

      oriented to person, place, time, and situation.  Cranial nerves II-XII grossly intact.      

      Motor strength 5/5 in all extremities.  Sensory grossly intact.  Cerebellar exam            

      normal.  Normal gait.                                                                       

21:52 Musculoskeletal/extremity: Extremities: grossly normal except: noted in the right knee:     

      decreased ROM, pain, swelling, ROM: intact in all extremities, Circulation is intact in     

      all extremities. Sensation intact. Weight bearing: can bear weight with assistance only.    

                                                                                                  

Vital Signs:                                                                                      

20:54  / 85; Pulse 74; Resp 18; Temp 98.5(O); Pulse Ox 96% on R/A; Weight 99.79 kg;     lp1 

      Height 6 ft. 1 in. (185.42 cm); Pain 8/10;                                                  

23:43  / 74; Pulse 66; Resp 16; Pulse Ox 100% on R/A;                                   la1 

20:54 Body Mass Index 29.03 (99.79 kg, 185.42 cm)                                             lp1 

                                                                                                  

Procedures:                                                                                       

22:24 Joint Treatment: Aspiration of right knee using 22g spinal needle. Removed 83 ml's of   rn  

      clear fluid, yellow fluid, Specimen sent to lab. Dressed with band aid, Patient             

      tolerated well.                                                                             

                                                                                                  

MDM:                                                                                              

20:58 Patient medically screened.                                                             kb  

21:52 Data reviewed: vital signs, nurses notes. Data interpreted: Pulse oximetry: on room air kb  

      is 96 %. Interpretation: normal.                                                            

                                                                                             

00:27 Counseling: I had a detailed discussion with the patient and/or guardian regarding: the kb  

      historical points, exam findings, and any diagnostic results supporting the                 

      discharge/admit diagnosis, lab results, radiology results, the need for outpatient          

      follow up, a family practitioner, to return to the emergency department if symptoms         

      worsen or persist or if there are any questions or concerns that arise at home.             

                                                                                                  

                                                                                             

21:06 Order name: CBC with Diff; Complete Time: 22:23                                         kb  

                                                                                             

21:06 Order name: Basic Metabolic Panel; Complete Time: 22:23                                 kb  

                                                                                             

21:06 Order name: CRP; Complete Time: 22:23                                                   kb  

                                                                                             

21:06 Order name: Sed Rate; Complete Time: 22:23                                              kb  

                                                                                             

21:06 Order name: Blood Culture Adult (2)                                                     kb  

                                                                                             

21:06 Order name: Lactate; Complete Time: 21:52                                               kb  

                                                                                             

21:16 Order name: Knee Right 3 View XRAY                                                      kb  

                                                                                             

22:04 Order name: Fluid Crystals; Complete Time: 23:34                                        kb  

                                                                                             

22:04 Order name: Fluid Cell Count,Body; Complete Time: 00:11                                 kb  

                                                                                             

22:07 Order name: Body Fluid Culture                                                          kb  

                                                                                             

21:06 Order name: IV Start; Complete Time: 21:30                                              kb  

                                                                                                  

Administered Medications:                                                                         

                                                                                             

21:36 Drug: Zofran 4 mg Route: IVP; Site: left antecubital;                                   ls4 

22:25 Follow up: Response: No adverse reaction                                                la1 

23:47 Follow up: Response: No adverse reaction                                                la1 

21:37 Drug: morphine 4 mg Route: IVP; Site: left antecubital;                                 ls4 

22:24 Follow up: Response: No adverse reaction; Pain is decreased                             la1 

23:47 Follow up: Response: No adverse reaction; Pain is decreased                             la1 

22:24 Drug: Lidocaine (1 %) 1 vials Volume: 5 ml; Route: Infiltration;                        la1 

23:42 Drug: Norco 10 mg-325 mg 1 tabs Route: PO;                                              la1 

23:47 Follow up: Response: No adverse reaction; Pain is decreased                             la1 

                                                                                                  

                                                                                                  

Disposition:                                                                                      

                                                                                             

01:29 Co-signature as Attending Physician, Tom Cee MD.                                    rn  

                                                                                                  

Disposition:                                                                                      

18 00:29 Discharged to Home. Impression: Inflammatory arthritis of right knee, Effusion,    

  right knee.                                                                                     

- Condition is Stable.                                                                            

- Discharge Instructions: Knee Effusion, Easy-to-Read, Arthritis, Easy-to-Read.                   

- Prescriptions for Tylenol- Codeine #3 300-30 mg Oral Tablet - take 1 tablet by ORAL             

  route every 4 hours As needed; 15 tablet. Medrol (Norris) 4 mg Oral Tablets, Dose Pack -           

  take 1 tablet by ORAL route as directed - follow package instructions; 1 packet.                

- Medication Reconciliation Form, Thank You Letter, Antibiotic Education, Prescription            

  Opioid Use form.                                                                                

- Follow up: Emergency Department; When: As needed; Reason: Worsening of condition.               

  Follow up: Private Physician; When: 2 - 3 days; Reason: Recheck today's complaints,             

  Continuance of care, Re-evaluation by your physician.                                           

                                                                                                  

                                                                                                  

                                                                                                  

Signatures:                                                                                       

Dispatcher [employer]                           Catherine Minor, FNP-C                 FNP-Ckb                                                   

Tom Cee MD MD rn Pena, Laura, RN                         RN   lp1                                                  

Brendan Solis RN                         RN   la1                                                  

Whitley Heredia RN                       RN   ls4                                                  

                                                                                                  

Corrections: (The following items were deleted from the chart)                                    

00:42 00:29 2018 00:29 Discharged to Home. Impression: Inflammatory arthritis of right  la1 

      knee; Effusion, right knee. Condition is Stable. Forms are Medication Reconciliation        

      Form, Thank You Letter, Antibiotic Education, Prescription Opioid Use. Follow up:           

      Emergency Department; When: As needed; Reason: Worsening of condition. Follow up:           

      Private Physician; When: 2 - 3 days; Reason: Recheck today's complaints, Continuance of     

      care, Re-evaluation by your physician. kb                                                   

                                                                                                  

**************************************************************************************************

## 2018-12-18 NOTE — ER
Nurse's Notes                                                                                     

 [facility]                                                                

Name: Apolinar Drummond                                                                                

Age: 63 yrs                                                                                       

Sex: Male                                                                                         

: 1955                                                                                   

MRN: [id number]                                                                                   

Arrival Date: 2018                                                                          

Time: 20:52                                                                                       

Account#: [id number]                                                                            

Bed 25                                                                                            

Private MD: DEDRA Tomlinson C                                                                            

Diagnosis: Inflammatory arthritis of right knee;Effusion, right knee                              

                                                                                                  

Presentation:                                                                                     

                                                                                             

20:53 Presenting complaint: Patient states: Pain and swelling to right knee that began        lp1 

      yesterday; hot to touch; states having chills yesterday; unable to bear weight.             

      Transition of care: patient was not received from another setting of care. Onset of         

      symptoms was 2018. Risk Assessment: Do you want to hurt yourself or            

      someone else? Patient reports no desire to harm self or others. Initial Sepsis Screen:      

      Does the patient meet any 2 criteria? No. Patient's initial sepsis screen is negative.      

      Does the patient have a suspected source of infection? No. Patient's initial sepsis         

      screen is negative. Care prior to arrival: None.                                            

20:53 Method Of Arrival: Wheelchair                                                           lp1 

20:53 Acuity: JAVI 3                                                                           lp1 

                                                                                                  

Triage Assessment:                                                                                

21:19 General: Appears uncomfortable, Behavior is calm, cooperative. Pain: Complains of pain  ls4 

      in right leg Pain currently is 8 out of 10 on a pain scale. Quality of pain is              

      described as burning, aching, Pain began 1 day ago. Neuro: No deficits noted.               

      Cardiovascular: Capillary refill < 3 seconds Patient's skin is warm and dry.                

      Respiratory: Airway is patent Respiratory effort is even, unlabored, Respiratory            

      pattern is regular. GI: Abdomen is non-distended. Musculoskeletal: Swelling present in      

      lateral aspect of right knee, posterior aspect of right knee, medial aspect of right        

      knee and right knee Reports pain in right leg since YESTERDAY .                             

                                                                                                  

Historical:                                                                                       

- Allergies:                                                                                      

20:55 No Known Allergies;                                                                     lp1 

- Home Meds:                                                                                      

20:55 Unable to obtain [Active];                                                              lp1 

- PMHx:                                                                                           

20:55 Rheumatoid Arthritis; Hyperlipidemia;                                                   lp1 

- PSHx:                                                                                           

20:55 knee replacement; neck surgery; carpel tunnel repair;                                   lp1 

                                                                                                  

- Immunization history:: Adult Immunizations up to date.                                          

- Social history:: Smoking status: Patient uses tobacco products, smokes one pack                 

  cigarettes per day.                                                                             

- Ebola Screening: : No symptoms or risks identified at this time.                                

                                                                                                  

                                                                                                  

Screenin:22 Abuse screen: Denies threats or abuse. Denies injuries from another. Nutritional        ls4 

      screening: No deficits noted. Tuberculosis screening: No symptoms or risk factors           

      identified. Fall Risk None identified.                                                      

                                                                                                  

Assessment:                                                                                       

21:22 General: SEE TRIAGE ASSESSMENT .                                                        ls4 

23:46 Pain: Complains of pain in medial aspect of right knee. Neuro: Level of Consciousness   la1 

      is awake, alert, obeys commands, Oriented to person, place, time, situation.                

      Cardiovascular: Capillary refill < 3 seconds Patient's skin is warm and dry.                

      Respiratory: Airway is patent Respiratory effort is even, unlabored, Respiratory            

      pattern is regular, symmetrical, Breath sounds are clear bilaterally. GI: No signs          

      and/or symptoms were reported involving the gastrointestinal system. : No signs           

      and/or symptoms were reported regarding the genitourinary system. Musculoskeletal:          

      Circulation, motion, and sensation intact. Capillary refill < 3 seconds, Swelling           

      present in right knee.                                                                      

                                                                                                  

Vital Signs:                                                                                      

20:54  / 85; Pulse 74; Resp 18; Temp 98.5(O); Pulse Ox 96% on R/A; Weight 99.79 kg;     lp1 

      Height 6 ft. 1 in. (185.42 cm); Pain 8/10;                                                  

23:43  / 74; Pulse 66; Resp 16; Pulse Ox 100% on R/A;                                   la1 

20:54 Body Mass Index 29.03 (99.79 kg, 185.42 cm)                                             lp1 

                                                                                                  

ED Course:                                                                                        

20:52 Patient arrived in ED.                                                                  ds1 

20:53 DEDRA Tomlinson MD is Private Physician.                                                       ds1 

20:54 Triage completed.                                                                       lp1 

20:55 Arm band placed on right wrist.                                                         lp1 

20:58 [facility]                                                        kb  

20:58 Tom Cee MD is Attending Physician.                                                kb  

21:13 Whitley Heredia, RN is Primary Nurse.                                                     ls4 

21:24 No provider procedures requiring assistance completed.                                  ls4 

21:29 Inserted saline lock: 20 gauge in left antecubital area, using aseptic technique. Blood jb5 

      collected.                                                                                  

21:30 Lactate Sent.                                                                           jb5 

21:30 Blood Culture Adult (2) Sent.                                                           jb5 

21:30 Sed Rate Sent.                                                                          jb5 

21:30 CRP Sent.                                                                               jb5 

21:30 Basic Metabolic Panel Sent.                                                             jb5 

21:31 CBC with Diff Sent.                                                                     jb5 

22:03 Knee Right 3 View XRAY In Process Unspecified.                                          EDMS

23:46 Call light in reach.                                                                    la1 

                                                                                             

00:41 IV discontinued, intact, bleeding controlled, No redness/swelling at site. Pressure     la1 

      dressing applied.                                                                           

                                                                                                  

Administered Medications:                                                                         

                                                                                             

21:36 Drug: Zofran 4 mg Route: IVP; Site: left antecubital;                                   ls4 

22:25 Follow up: Response: No adverse reaction                                                la1 

23:47 Follow up: Response: No adverse reaction                                                la1 

21:37 Drug: morphine 4 mg Route: IVP; Site: left antecubital;                                 ls4 

22:24 Follow up: Response: No adverse reaction; Pain is decreased                             la1 

23:47 Follow up: Response: No adverse reaction; Pain is decreased                             la1 

22:24 Drug: Lidocaine (1 %) 1 vials Volume: 5 ml; Route: Infiltration;                        la1 

23:42 Drug: Norco 10 mg-325 mg 1 tabs Route: PO;                                              la1 

23:47 Follow up: Response: No adverse reaction; Pain is decreased                             la1 

                                                                                                  

                                                                                                  

Outcome:                                                                                          

                                                                                             

00:29 Discharge ordered by MD.                                                                ludwig  

00:41 Discharged to home ambulatory.                                                          la1 

00:41 Condition: stable                                                                           

00:41 Discharge instructions given to patient, Instructed on discharge instructions, follow       

      up and referral plans. medication usage, Demonstrated understanding of instructions,        

      follow-up care, medications, Prescriptions given X 2.                                       

00:42 Patient left the ED.                                                                    la1 

                                                                                                  

Signatures:                                                                                       

Dispatcher [employer]                           EDMS                                                 

Catherine Culver, FNP-C                 FNP-Obdulia Wilhelm                                ds1                                                  

Maria Luisa Mills, RN                         RN   lp1                                                  

Brendan Solis RN                         RN   la1                                                  

Chaya Hadley                          jb5                                                  

Whitley Heredia, RN                       RN   ls4                                                  

                                                                                                  

**************************************************************************************************

## 2024-08-21 ENCOUNTER — HOSPITAL ENCOUNTER (INPATIENT)
Dept: HOSPITAL 97 - 2ND | Age: 69
LOS: 1 days | Discharge: ANOTHER HOSPITAL | DRG: 311 | End: 2024-08-22
Attending: INTERNAL MEDICINE | Admitting: INTERNAL MEDICINE
Payer: COMMERCIAL

## 2024-08-21 VITALS — BODY MASS INDEX: 27.1 KG/M2

## 2024-08-21 DIAGNOSIS — K57.90: ICD-10-CM

## 2024-08-21 DIAGNOSIS — Z79.02: ICD-10-CM

## 2024-08-21 DIAGNOSIS — M06.9: ICD-10-CM

## 2024-08-21 DIAGNOSIS — J30.9: ICD-10-CM

## 2024-08-21 DIAGNOSIS — R60.0: ICD-10-CM

## 2024-08-21 DIAGNOSIS — K21.9: ICD-10-CM

## 2024-08-21 DIAGNOSIS — Z79.899: ICD-10-CM

## 2024-08-21 DIAGNOSIS — Z96.653: ICD-10-CM

## 2024-08-21 DIAGNOSIS — I31.39: ICD-10-CM

## 2024-08-21 DIAGNOSIS — I31.4: ICD-10-CM

## 2024-08-21 DIAGNOSIS — N40.0: ICD-10-CM

## 2024-08-21 DIAGNOSIS — I20.0: Primary | ICD-10-CM

## 2024-08-21 DIAGNOSIS — I48.91: ICD-10-CM

## 2024-08-21 DIAGNOSIS — E11.9: ICD-10-CM

## 2024-08-21 DIAGNOSIS — I10: ICD-10-CM

## 2024-08-21 DIAGNOSIS — Z11.52: ICD-10-CM

## 2024-08-21 DIAGNOSIS — F17.200: ICD-10-CM

## 2024-08-21 LAB
ALBUMIN SERPL BCP-MCNC: 3 G/DL (ref 3.4–5)
ALBUMIN/GLOB SERPL: 0.8 {RATIO} (ref 1.1–1.8)
ALP SERPL-CCNC: 55 U/L (ref 45–117)
ALT SERPL W P-5'-P-CCNC: 47 U/L (ref 16–61)
ANION GAP SERPL CALC-SCNC: 11.3 MEQ/L (ref 5–15)
ANION GAP SERPL CALC-SCNC: 11.4 MEQ/L (ref 5–15)
AST SERPL W P-5'-P-CCNC: 33 U/L (ref 15–37)
BUN BLD-MCNC: 26 MG/DL (ref 7–18)
BUN BLD-MCNC: 27 MG/DL (ref 7–18)
GLOBULIN SER CALC-MCNC: 3.9 G/DL (ref 2.3–3.5)
GLUCOSE SERPLBLD-MCNC: 124 MG/DL (ref 74–106)
GLUCOSE SERPLBLD-MCNC: 130 MG/DL (ref 74–106)
HCT VFR BLD CALC: 29.3 % (ref 39.6–49)
HGB BLD-MCNC: 9.9 G/DL (ref 13.6–17.9)
LYMPHOCYTES # SPEC AUTO: 1 K/UL (ref 0.7–4.9)
MAGNESIUM SERPL-MCNC: 2.2 MG/DL (ref 1.6–2.4)
MAGNESIUM SERPL-MCNC: 2.2 MG/DL (ref 1.6–2.4)
MCH RBC QN AUTO: 31 PG (ref 27–35)
MCHC RBC AUTO-ENTMCNC: 33.8 G/DL (ref 32–36)
MCV RBC: 91.9 FL (ref 80–100)
NRBC # BLD: 0 10*3/UL (ref 0–0)
NRBC BLD AUTO-RTO: 0 % (ref 0–0)
NT-PROBNP SERPL-MCNC: 990 PG/ML (ref ?–125)
PMV BLD: 7.4 FL (ref 7.6–11.3)
POTASSIUM SERPL-SCNC: 4.3 MEQ/L (ref 3.5–5.1)
POTASSIUM SERPL-SCNC: 4.4 MEQ/L (ref 3.5–5.1)
RBC # BLD: 3.19 M/UL (ref 4.33–5.43)
TROPONIN I SERPL HS-MCNC: 36.3 PG/ML (ref ?–58.9)
TROPONIN I SERPL HS-MCNC: 45.2 PG/ML (ref ?–58.9)
WBC # BLD AUTO: 6.9 THOU/UL (ref 4.3–10.9)

## 2024-08-21 PROCEDURE — 80048 BASIC METABOLIC PNL TOTAL CA: CPT

## 2024-08-21 PROCEDURE — 83735 ASSAY OF MAGNESIUM: CPT

## 2024-08-21 PROCEDURE — 85025 COMPLETE CBC W/AUTO DIFF WBC: CPT

## 2024-08-21 PROCEDURE — 94640 AIRWAY INHALATION TREATMENT: CPT

## 2024-08-21 PROCEDURE — 83880 ASSAY OF NATRIURETIC PEPTIDE: CPT

## 2024-08-21 PROCEDURE — 80061 LIPID PANEL: CPT

## 2024-08-21 PROCEDURE — 87811 SARS-COV-2 COVID19 W/OPTIC: CPT

## 2024-08-21 PROCEDURE — 81001 URINALYSIS AUTO W/SCOPE: CPT

## 2024-08-21 PROCEDURE — 93306 TTE W/DOPPLER COMPLETE: CPT

## 2024-08-21 PROCEDURE — 36415 COLL VENOUS BLD VENIPUNCTURE: CPT

## 2024-08-21 PROCEDURE — 80053 COMPREHEN METABOLIC PANEL: CPT

## 2024-08-21 PROCEDURE — 93005 ELECTROCARDIOGRAM TRACING: CPT

## 2024-08-21 PROCEDURE — 84484 ASSAY OF TROPONIN QUANT: CPT

## 2024-08-21 PROCEDURE — 71046 X-RAY EXAM CHEST 2 VIEWS: CPT

## 2024-08-21 RX ADMIN — ROSUVASTATIN CALCIUM SCH MG: 10 TABLET, COATED ORAL at 22:36

## 2024-08-21 RX ADMIN — AMIODARONE HYDROCHLORIDE SCH MLS: 1.8 INJECTION, SOLUTION INTRAVENOUS at 22:07

## 2024-08-21 RX ADMIN — ALBUTEROL SULFATE SCH: 2.5 SOLUTION RESPIRATORY (INHALATION) at 21:00

## 2024-08-21 RX ADMIN — ASPIRIN ONE: 81 TABLET, COATED ORAL at 16:15

## 2024-08-21 RX ADMIN — DIGOXIN SCH: 0.25 INJECTION INTRAMUSCULAR; INTRAVENOUS at 21:00

## 2024-08-21 RX ADMIN — ASPIRIN ONE MG: 81 TABLET, COATED ORAL at 16:13

## 2024-08-21 RX ADMIN — ENOXAPARIN SODIUM SCH MG: 100 INJECTION SUBCUTANEOUS at 16:13

## 2024-08-21 RX ADMIN — TRAZODONE HYDROCHLORIDE SCH MG: 150 TABLET ORAL at 21:00

## 2024-08-21 RX ADMIN — DEXTROSE MONOHYDRATE STA MLS: 50 INJECTION, SOLUTION INTRAVENOUS at 21:55

## 2024-08-21 NOTE — P.CNS
Date of Consult: 08/21/24


Chief Complaint: Chest pain, SOB


History of Present Illness: 





Patient with PMH of HTN, Tobacco use presented with worsening SOB for the last 

month, got worse over last week, with palpitations and chest pain, pressure in 

nature, mid chest, radiate to the back of his shoulder, denies syncope, also 

report bilateral lower extremity swelling.


Allergies





No Known Drug Allergies Allergy (Unverified 07/25/15 08:24)


   Unknown








- Past Medical/Surgical History


Diabetic: No


-: htn


-: renal issues


-: arthritis


-: bilateral knee replacement


-: jaw surgery x 25


-: back surgery


-: 3 metal plates in neck


-: lung surgery





- Social History


Smoking Status: Current every day smoker


Alcohol use: No


CD- Drugs: No


Caffeine use: Yes


Place of Residence: Home





Review of Systems


10-point ROS is otherwise unremarkable





Physical Examination














Temp Pulse Resp BP Pulse Ox


 


 99 F   120 H  18   129/79   93 


 


 08/21/24 16:00  08/21/24 16:00  08/21/24 16:00  08/21/24 16:00  08/21/24 16:00








General: Alert, In no apparent distress


HEENT: Atraumatic, PERRLA, Mucous membr. moist/pink, EOMI, Sclerae nonicteric


Neck: Supple, 2+ carotid pulse no bruit, No LAD, Without JVD or thyroid 

abnormality


Respiratory: Diminished, Dull, Crackles/rales, Rhonchi/gurgles


Cardiovascular: Regular rate/rhythm, Normal S1 S2, Edema


Gastrointestinal: Normal bowel sounds, No tenderness


Musculoskeletal: No tenderness


Integumentary: No rashes


Neurological: Normal gait, Normal speech, Normal tone, Normal affect


Lymphatics: No axilla or inguinal lymphadenopathy


Laboratory Data (last 24 hrs)











  08/21/24 08/21/24





  14:30 14:30


 


WBC   6.90


 


Hgb   9.9 L


 


Hct   29.3 L


 


Plt Count   264


 


Sodium  135 L 


 


Potassium  4.4 


 


BUN  26 H 


 


Creatinine  1.41 H 


 


Glucose  124 H 


 


Magnesium  2.2 


 


Total Bilirubin  0.4 


 


AST  33 


 


ALT  47 


 


Alkaline Phosphatase  55 











- Problems


(1) Unstable angina


Current Visit: Yes   Status: Acute   


Plan: 


get cardiac enzymes


 mg po x1 then start ASA 81 mg daily


Lipitor 40 mg daily


NPO after midnight for coronary angiogram in am


get Echo











(2) Bilateral lower extremity edema


Current Visit: Yes   Status: Acute   


Plan: 


Lasix 40 mg IV BID


Monitor input and output and electrolytes


get echo











(3) HTN (hypertension)


Current Visit: Yes   Status: Acute   


Plan: 


continue Coreg 25 mg po BID

## 2024-08-21 NOTE — RAD REPORT
EXAM DESCRIPTION:  RAD - Chest Pa And Lat (2 Views) - 8/21/2024 2:20 pm

 

CLINICAL HISTORY:  chest pain

Chest pain.

 

COMPARISON:  Chest Pa And Lat (2 Views) dated 2/28/2019; CHEST PA AND LAT 2 VIEW dated 3/25/2016; KENDRA
ST PA AND LAT 2 VIEW dated 4/8/2015; CHEST PA AND LAT 2 VIEW dated 5/12/2014

 

FINDINGS:  The lungs are clear. The heart is significantly enlarged. No displaced fractures. Cervical
 hardware plate.

 

IMPRESSION:  Prominent cardiomegaly.

## 2024-08-22 VITALS — DIASTOLIC BLOOD PRESSURE: 65 MMHG | TEMPERATURE: 98.1 F | SYSTOLIC BLOOD PRESSURE: 131 MMHG

## 2024-08-22 VITALS — OXYGEN SATURATION: 93 %

## 2024-08-22 LAB
HDLC SERPL-MCNC: 37 MG/DL (ref 40–60)
LDLC SERPL CALC-MCNC: 23 MG/DL (ref ?–130)
LDLC SERPL-MCNC: 23 MG/DL
SARS-COV+SARS-COV-2 AG RESP QL IA.RAPID: (no result)
UA COMPLETE W REFLEX CULTURE PNL UR: (no result)
UA DIPSTICK W REFLEX MICRO PNL UR: (no result)

## 2024-08-22 RX ADMIN — AMIODARONE HYDROCHLORIDE SCH MLS: 1.8 INJECTION, SOLUTION INTRAVENOUS at 04:27

## 2024-08-22 RX ADMIN — DEXTROSE SCH MLS: 5 SOLUTION INTRAVENOUS at 14:40

## 2024-08-22 RX ADMIN — ASPIRIN SCH: 81 TABLET, COATED ORAL at 09:00

## 2024-08-22 RX ADMIN — ACETAMINOPHEN PRN MG: 500 TABLET, FILM COATED ORAL at 12:50

## 2024-08-22 RX ADMIN — ALBUMIN (HUMAN) SCH: 5 SOLUTION INTRAVENOUS at 09:00

## 2024-08-22 NOTE — P.PN
Subjective


Date of Service: 08/22/24


Chief Complaint: Chest pain, SOB


Subjective: No new changes (still complain of shortness of breath.)





Review of Systems


10-point ROS is otherwise unremarkable





Physical Examination





- Vital Signs


Temperature: 98.1 F


Blood Pressure: 131/65


Pulse: 97


Respirations: 19


Pulse Ox (%): 98





- Physical Exam


General: Alert, In no apparent distress


HEENT: Atraumatic, PERRLA, EOMI


Neck: Supple, JVD not distended


Respiratory: Diminished, Crackles/rales, Rhonchi/gurgles


Cardiovascular: Regular rate/rhythm, Normal S1 S2, Edema (+1 to bilateral lower 

extremities)


Gastrointestinal: Normal bowel sounds, No tenderness


Musculoskeletal: No tenderness


Integumentary: No rashes


Neurological: Normal speech, Normal tone, Normal affect


Lymphatics: No axilla or inguinal lymphadenopathy





- Studies


Laboratory Data (last 24 hrs)











  08/22/24 08/21/24 08/21/24





  06:43 21:07 14:30


 


WBC   


 


Hgb   


 


Hct   


 


Plt Count   


 


Sodium   137  135 L


 


Potassium   4.3  4.4


 


BUN   27 H  26 H


 


Creatinine   1.38 H  1.41 H


 


Glucose   130 H  124 H


 


Magnesium   2.2  2.2


 


Total Bilirubin    0.4


 


AST    33


 


ALT    47


 


Alkaline Phosphatase    55


 


Triglycerides  61  


 


Cholesterol  72  


 


HDL Cholesterol  37 L  


 


Cholesterol/HDL Ratio  1.95  














  08/21/24





  14:30


 


WBC  6.90


 


Hgb  9.9 L


 


Hct  29.3 L


 


Plt Count  264


 


Sodium 


 


Potassium 


 


BUN 


 


Creatinine 


 


Glucose 


 


Magnesium 


 


Total Bilirubin 


 


AST 


 


ALT 


 


Alkaline Phosphatase 


 


Triglycerides 


 


Cholesterol 


 


HDL Cholesterol 


 


Cholesterol/HDL Ratio 








Medications List Reviewed: Yes





Assessment And Plan





- Current Problems (Diagnosis)


(1) Unstable angina


Current Visit: Yes   Status: Acute   


Plan: 


cardiac enzymes are negative x3, plan was for coronary angiogram but patient 

still can not lay flat, echo shows large pericardial effusion with early signs 

of tamponade.


 mg po x1 then start ASA 81 mg daily


Lipitor 40 mg daily











(2) Bilateral lower extremity edema


Current Visit: Yes   Status: Acute   


Plan: 


Lasix 40 mg IV BID


Monitor input and output and electrolytes











(3) HTN (hypertension)


Current Visit: Yes   Status: Acute   


Plan: 


medications on hold due to soft BP.











(4) Atrial fibrillation


Current Visit: Yes   Status: Acute   


Plan: 


patient went into RVR overnight, responded well to amiodarone bolus and drip, 

now in sinus rhythm


continue drip


continue lovenox








(5) Cardiac tamponade


Current Visit: Yes   Status: Acute   


Plan: 


Echo shows very large effusion with early signs of tamponade, hemodynaimcally 

stable but this effusion will need to be drained, we do not have CT surgery back

up here so would recommend transfer to medical center where CT surgery back up 

is available.

## 2024-08-22 NOTE — ECHO
HEIGHT: 6 ft 1 in   WEIGHT: 206 lb 0 oz   DATE OF STUDY: 08/22/2024   REFER DR: Rodríguez Tomlinson MD

2-DIMENSIONAL: YES

     M.MODE: YES

 DOPPLER: YES

COLOR FLOW: YES



                    TDS:  

PORTABLE: YES

 DEFINITY:  

BUBBLE STUDY: 





DIAGNOSIS:  UNSTABLE ANGINA



CARDIAC HISTORY:  

CATHERIZATION: 

SURGERY: 

PROSTHETIC VALVE: 

PACEMAKER: 





MEASUREMENTS (cm)

    DIASTOLIC (NORMALS)                 SYSTOLIC (NORMALS)

IVSd                 1.2 (0.6-1.2)                    LA Diam 3.8 (1.9-4.0)     LVEF       
  60-65%  

LVIDd               3.6 (3.5-5.7)                        LVIDs      2.7 (2.0-3.5)     %FS  
        25%

LVPWd             1.3 (0.6-1.2)

Ao Diam           3.9 (2.0-3.7)



2 DIMENSIONAL ASSESSMENT:

RIGHT ATRIUM:                   NORMAL

LEFT ATRIUM:       NORMAL



RIGHT VENTRICLE:            NORMAL

LEFT VENTRICLE: NORMAL



TRICUSPID VALVE:  MILD TRICUSPID REGURGITATION

MITRAL VALVE:     NORMAL



PULMONIC VALVE:             NORMAL

AORTIC VALVE:     NORMAL



PERICARDIAL EFFUSION: LARGE EFFUSION

AORTIC ROOT:      NORMAL





LEFT VENTRICULAR WALL MOTION:     NORMAL



DOPPLER/COLOR FLOW:     GRADE I DIASTOLIC DYSFUNCTION



COMMENTS:      

  1. LARGE CIRCUMFRENTIAL PERICARDIAL EFFUSION WITH EARLY SIGNS OF TAMPONADE.  INFERIOR 
VENA CAVA DILATED.  RESPIRATORY VARIATION CAN BE SEEN THROUGH MITRAL AND TRICUSPID VALVE.

  2. NORMAL SYSTOLIC FUNCTION, EJECTION FRACTION 60-65%, NORMAL WALL MOTION

  3. GRADE I DIASTOLIC DYSFUNCTION

  4. ELEVATED FILLING PRESSURE (RIGHT ATRIAL PRESSURE GREATER THAN 20 mmHg)



TECHNOLOGIST:   [facility]
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
## 2024-08-23 NOTE — EKG
Test Date:    2024-08-21               Test Time:    17:57:48

Technician:   ZENY                                     

                                                     

MEASUREMENT RESULTS:                                       

Intervals:                                           

Rate:         121                                    

NV:                                                  

QRSD:         96                                     

QT:           304                                    

QTc:          431                                    

Axis:                                                

P:                                                   

NV:                                                  

QRS:          59                                     

T:            -12                                    

                                                     

INTERPRETIVE STATEMENTS:                                       

                                                     

Atrial fibrillation with rapid ventricular response

Incomplete right bundle branch block

Nonspecific ST and T wave abnormality, probably digitalis effect

Abnormal ECG

Compared to ECG 07/10/2023 12:41:29

ST (T wave) deviation now present

Sinus bradycardia no longer present



Electronically Signed On 08-23-24 13:28:59 CDT by Aly Busch

## 2024-08-23 NOTE — DS
Date of Discharge:  08/22/2024



Disposition:  The patient was transferred via ground ambulance to [facility] in [facility].



Physical Examination:

HEENT:  Unremarkable. 

Lungs:  Clear to auscultation. 

Heart:  Sounds normal. 

Abdomen:  Soft.  Bowel sounds normal.  No guarding, rigidity, tenderness, distention. 

Extremities:  No leg edema.



Hospital Course:  This is a 68-year-old very pleasant male patient, who came into office with complai
nts of shortness of breath and after he was evaluated at office, he was admitted to the hospital.  Pl
ease see dictated H and P for more information.  After the patient was evaluated at office, he was ad
mitted to the hospital once all arrangements completed and initial blood work we did was unremarkable
 except creatinine was 1.4.  After the patient was admitted to the hospital on telemetry monitor, we 
noted that he went into atrial fibrillation with rapid ventricular rate late yesterday evening and hi
s systolic blood pressure was around 100 to 110 range.  So Dr. Busch started him on IV amiodarone dr pal and since the patient was admitted to the hospital with diagnosis of unstable angina, he already h
ad received 1 dose of aspirin 81 mg daily upon admission, and we started him on Lovenox 80 mg subcuta
neous injection every 12 hours, so far for this hospital stay, he has received 2 doses of Lovenox.  T
his morning, repeat blood work had shown creatinine 1.3, otherwise chemistry was unremarkable.  His t
roponin was normal.  BNP was around 900 range.  Echocardiogram was ordered upon admission, but it was
 done today and plan was to have the patient go to cardiac cath lab, but cardiac cath was not done as
 Dr. Busch looked at his echocardiogram before cardiac cath soon after it was done today and he info
rmed me that the patient has very large pericardial effusion with early signs of cardiac tamponade an
d with that, he suggested for us to transfer him to [place] for higher level of care.  Immediately, keara benoit started to make arrangements for him to go to [place] and I did communicate with hospitalist and ca
[facility]di[facility] and all the details were discussed.  Also informed cardiologist 
about the patient's history of rheumatoid arthritis and recent respiratory illness where he had gone 
to [facility] with cough, congestion, and shortness of breath type of feeling and took antibi
otic about 3 weeks ago or so, so we really do not know the underlying etiology for this pericardial e
ffusion, but those 2 important history, we will need to keep that in back of our mind to see whether 
this is due to some type of connective tissue disease or due to recent infection.  After all arrangem
ents completed, the patient was transferred via ground ambulance in stable condition.



Final Diagnoses:  

1.Large pericardial effusion with cardiac tamponade.

2.Hypertension.

3.Hyperlipidemia. 

Labs reviewed. 



Total time spent 60 minutes including visiting the patient today at the hospital, communication with 
our cardiologist regarding echocardiogram finding, and further management and communication with [facility] and cardiologist as well as hospitalist to make arrangements for transfer process.





MALIA/MODL

DD:  08/23/2024 12:26:19Voice ID:  [id number]

DT:  08/23/2024 22:47:48Report ID:  [id number]

## 2025-05-26 ENCOUNTER — HOSPITAL ENCOUNTER (EMERGENCY)
Dept: HOSPITAL 97 - ER | Age: 70
LOS: 1 days | Discharge: HOME | End: 2025-05-27
Payer: COMMERCIAL

## 2025-05-26 DIAGNOSIS — I48.91: Primary | ICD-10-CM

## 2025-05-26 DIAGNOSIS — E78.5: ICD-10-CM

## 2025-05-26 DIAGNOSIS — Z85.118: ICD-10-CM

## 2025-05-26 LAB
ALBUMIN SERPL BCP-MCNC: 2.4 G/DL (ref 3.4–5)
ALBUMIN/GLOB SERPL: 0.6 {RATIO} (ref 1.1–1.8)
ALP SERPL-CCNC: 49 U/L (ref 45–117)
ALT SERPL W P-5'-P-CCNC: 20 U/L (ref 16–61)
ANION GAP SERPL CALC-SCNC: 10.4 MEQ/L (ref 5–15)
AST SERPL W P-5'-P-CCNC: 21 U/L (ref 15–37)
BUN BLD-MCNC: 19 MG/DL (ref 7–18)
GLUCOSE SERPLBLD-MCNC: 132 MG/DL (ref 74–106)
HGB BLD-MCNC: 11.3 G/DL (ref 13.6–17.9)
INR BLD: 1.14
LYMPHOCYTES # SPEC AUTO: 0.8 K/UL (ref 0.7–4.9)
MAGNESIUM SERPL-MCNC: 1.7 MG/DL (ref 1.6–2.4)
MCH RBC QN AUTO: 26.7 PG (ref 27–35)
MCHC RBC AUTO-ENTMCNC: 33.3 G/DL (ref 32–36)
MCV RBC: 80.1 FL (ref 80–100)
NRBC BLD AUTO-RTO: 0.2 % (ref 0–0)
NT-PROBNP SERPL-MCNC: 1468 PG/ML (ref ?–125)
PMV BLD: 8.4 FL (ref 7.6–11.3)
POTASSIUM SERPL-SCNC: 3.4 MEQ/L (ref 3.5–5.1)
PROTHROMBIN TIME: 12.9 SECONDS (ref 10–13)
RBC # BLD: 4.24 M/UL (ref 4.33–5.43)
TROPONIN I SERPL HS-MCNC: 14.4 PG/ML (ref ?–58.9)

## 2025-05-26 PROCEDURE — 83880 ASSAY OF NATRIURETIC PEPTIDE: CPT

## 2025-05-26 PROCEDURE — 85610 PROTHROMBIN TIME: CPT

## 2025-05-26 PROCEDURE — 96366 THER/PROPH/DIAG IV INF ADDON: CPT

## 2025-05-26 PROCEDURE — 96367 TX/PROPH/DG ADDL SEQ IV INF: CPT

## 2025-05-26 PROCEDURE — 80048 BASIC METABOLIC PNL TOTAL CA: CPT

## 2025-05-26 PROCEDURE — 85025 COMPLETE CBC W/AUTO DIFF WBC: CPT

## 2025-05-26 PROCEDURE — 84484 ASSAY OF TROPONIN QUANT: CPT

## 2025-05-26 PROCEDURE — 83735 ASSAY OF MAGNESIUM: CPT

## 2025-05-26 PROCEDURE — 99285 EMERGENCY DEPT VISIT HI MDM: CPT

## 2025-05-26 PROCEDURE — 71045 X-RAY EXAM CHEST 1 VIEW: CPT

## 2025-05-26 PROCEDURE — 36415 COLL VENOUS BLD VENIPUNCTURE: CPT

## 2025-05-26 PROCEDURE — 96375 TX/PRO/DX INJ NEW DRUG ADDON: CPT

## 2025-05-26 PROCEDURE — 80076 HEPATIC FUNCTION PANEL: CPT

## 2025-05-26 PROCEDURE — 96365 THER/PROPH/DIAG IV INF INIT: CPT

## 2025-05-26 PROCEDURE — 93005 ELECTROCARDIOGRAM TRACING: CPT

## 2025-05-27 VITALS — SYSTOLIC BLOOD PRESSURE: 113 MMHG | DIASTOLIC BLOOD PRESSURE: 81 MMHG | TEMPERATURE: 98.1 F | OXYGEN SATURATION: 94 %
